# Patient Record
Sex: FEMALE | Race: BLACK OR AFRICAN AMERICAN | NOT HISPANIC OR LATINO | Employment: FULL TIME | URBAN - METROPOLITAN AREA
[De-identification: names, ages, dates, MRNs, and addresses within clinical notes are randomized per-mention and may not be internally consistent; named-entity substitution may affect disease eponyms.]

---

## 2017-01-23 ENCOUNTER — OFFICE VISIT (OUTPATIENT)
Dept: INTERNAL MEDICINE | Facility: CLINIC | Age: 57
End: 2017-01-23
Payer: COMMERCIAL

## 2017-01-23 VITALS
HEIGHT: 66 IN | SYSTOLIC BLOOD PRESSURE: 140 MMHG | OXYGEN SATURATION: 97 % | BODY MASS INDEX: 23.1 KG/M2 | WEIGHT: 143.75 LBS | HEART RATE: 69 BPM | DIASTOLIC BLOOD PRESSURE: 76 MMHG | TEMPERATURE: 98 F

## 2017-01-23 DIAGNOSIS — S76.912A MUSCLE STRAIN OF LEFT THIGH, INITIAL ENCOUNTER: Primary | ICD-10-CM

## 2017-01-23 PROCEDURE — 3077F SYST BP >= 140 MM HG: CPT | Mod: S$GLB,,, | Performed by: FAMILY MEDICINE

## 2017-01-23 PROCEDURE — 99999 PR PBB SHADOW E&M-EST. PATIENT-LVL III: CPT | Mod: PBBFAC,,, | Performed by: FAMILY MEDICINE

## 2017-01-23 PROCEDURE — 3078F DIAST BP <80 MM HG: CPT | Mod: S$GLB,,, | Performed by: FAMILY MEDICINE

## 2017-01-23 PROCEDURE — 99212 OFFICE O/P EST SF 10 MIN: CPT | Mod: S$GLB,,, | Performed by: FAMILY MEDICINE

## 2017-01-23 PROCEDURE — 1159F MED LIST DOCD IN RCRD: CPT | Mod: S$GLB,,, | Performed by: FAMILY MEDICINE

## 2017-01-23 RX ORDER — NAPROXEN 500 MG/1
500 TABLET ORAL 2 TIMES DAILY
Qty: 30 TABLET | Refills: 0 | Status: SHIPPED | OUTPATIENT
Start: 2017-01-23 | End: 2017-01-25

## 2017-01-23 NOTE — PROGRESS NOTES
Subjective:       Patient ID: Adelina Espitia is a 56 y.o. female.    Chief Complaint: pain in left side    HPI Comments: Pain to her left leg from groin to knee starting 4 days ago - while trying on a pair of pants. Notes that she was having left shoulder pain going on for a month - neck/shoulder blade/neck stiffness - and now that is not bothering her any more since she started with the leg pain - she is taking more medication now than previously.    Her right knee is doing very well - no problems since recovery from surgery last May.    Leg Pain    There was no injury mechanism. The pain is present in the left thigh. The quality of the pain is described as stabbing. The pain is at a severity of 10/10. The pain is severe. The pain has been constant since onset. Associated symptoms include an inability to bear weight. Pertinent negatives include no loss of sensation, muscle weakness or tingling. She has tried NSAIDs, heat and non-weight bearing (ibuprofen, jason body and back) for the symptoms. The treatment provided moderate relief.     Review of Systems   Cardiovascular: Negative for leg swelling.   Musculoskeletal: Positive for gait problem, myalgias, neck pain (resolved) and neck stiffness.   Neurological: Negative for tingling.       Objective:      Physical Exam   Constitutional: She is oriented to person, place, and time. She appears well-developed.   HENT:   Head: Normocephalic and atraumatic.   Cardiovascular: Normal rate, regular rhythm and normal heart sounds.    Pulmonary/Chest: Effort normal and breath sounds normal.   Musculoskeletal:   TTP to ant left thigh starting above the inguinal fold.   Pain with hip extension (posterior flexion) of right.  Midline spine and B paraspinals NTTP TS/LS. Buttocks NTTP B   Neurological: She is alert and oriented to person, place, and time.   M/S 5/5 knee/anikle B.   Skin: Skin is warm and dry.   Psychiatric: She has a normal mood and affect. Her behavior is  normal.       Assessment:       1. Muscle strain of left thigh, initial encounter        Plan:     1. Muscle strain of left thigh, initial encounter  naproxen (NAPROSYN) 500 MG tablet   recheck prn worsening and in 4 weeks if not better

## 2017-01-23 NOTE — PATIENT INSTRUCTIONS
Treating Strains and Sprains  Strains and sprains happen when muscles or other soft tissues near your bones stretch or tear. These injuries can cause bruising, swelling, and pain. To ease your discomfort and speed the healing of your strain or sprain, follow the tips below. Remember, a strain or sprain can take 6 to 8 weeks to heal.     Important Note: Do not give aspirin to children or teens without discussing it with your healthcare provider first.        Ice first, heat later  · Use ice for the first 24 to 48 hours after injury. Ice helps prevent swelling and reduce pain. Ice the injury for no more than 20 minutes at a time and allow at least  20 minutes between icing sessions.  · Apply heat after the first 72 hours, once the swelling has gone down. Heat relaxes muscles and increases blood flow. Soak the injured area in warm water or use a heating pad set on low for no more than 15 minutes at a time.  Wrap and elevate  · Wrap an injured limb firmly with an elastic bandage. This provides support and helps prevent swelling. Dont wear an elastic bandage overnight. Watch for tingling, numbness, or increased pain, and remove the bandage immediately if any of these occurs.  · Elevate the injured area to help reduce swelling and throbbing. Its best to raise an injured limb above the level of your heart.     Medicines  · Over-the-counter medicines such as acetaminophen or ibuprofen can help reduce pain. Some also help reduce swelling.  · Take medicine only as directed.  · Rest the area even if medicines are controlling the pain.  Rest  · Rest the injured area by not using it for 24 hours.  · When youre ready, return slowly to your normal activities. Rest the injured area often.  · Dont use or walk on an injured limb if it hurts.  © 6526-3146 [employer]. [address] [place] [address], [place] [address]. All rights reserved. This information is not intended as a substitute for professional medical care.  Always follow your healthcare professional's instructions.

## 2017-01-23 NOTE — MR AVS SNAPSHOT
[facility]  [address] [place] [address]  [place] [address]  Phone: [phone]  Fax: [phone]                  Adelina Espitia   2017 2:20 PM   Office Visit    Description:  Female : 1960   Provider:  Monica Mendez MD   Department:  [facility]           Reason for Visit     pain in left side           Diagnoses this Visit        Comments    Muscle strain of left thigh, initial encounter    -  Primary            To Do List           Goals (5 Years of Data)     None      Follow-Up and Disposition     Return in about 4 weeks (around 2017), or if symptoms worsen or fail to improve.    Follow-up and Disposition History       These Medications        Disp Refills Start End    naproxen (NAPROSYN) 500 MG tablet 30 tablet 0 2017     Take 1 tablet (500 mg total) by mouth 2 (two) times daily. As needed for pain/inflammation, with food - Oral    Pharmacy: [facility]/pharmacy #[id number] - [address] #: [phone]         [facility] On Call     [facility] On Call Nurse Care Line -  Assistance  Registered nurses in the [facility] On Call Center provide clinical advisement, health education, appointment booking, and other advisory services.  Call for this free service at [phone].             Medications           Message regarding Medications     Verify the changes and/or additions to your medication regime listed below are the same as discussed with your clinician today.  If any of these changes or additions are incorrect, please notify your healthcare provider.        START taking these NEW medications        Refills    naproxen (NAPROSYN) 500 MG tablet 0    Sig: Take 1 tablet (500 mg total) by mouth 2 (two) times daily. As needed for pain/inflammation, with food    Class: Normal    Route: Oral           Verify that the below list of medications is an accurate representation of the medications you are currently taking.  If none reported, the list  "may be blank. If incorrect, please contact your healthcare provider. Carry this list with you in case of emergency.           Current Medications     biotin 1 mg tablet Take 1,000 mcg by mouth once daily.    diltiazem (CARDIZEM CD) 180 MG 24 hr capsule Take 180 mg by mouth once daily.    losartan (COZAAR) 50 MG tablet Take 50 mg by mouth once daily.    LUMIGAN 0.01 % Drop 1 drop every evening. Both eyes    pantoprazole (PROTONIX) 40 MG tablet TAKE ONE TABLET BY MOUTH ONCE DAILY    naproxen (NAPROSYN) 500 MG tablet Take 1 tablet (500 mg total) by mouth 2 (two) times daily. As needed for pain/inflammation, with food           Clinical Reference Information           Vital Signs - Last Recorded  Most recent update: 1/23/2017  2:37 PM by Arlyn Soriano LPN    BP Pulse Temp Ht Wt LMP    (!) 140/76 (BP Location: Right arm, Patient Position: Sitting, BP Method: Automatic) 69 98 °F (36.7 °C) (Oral) 5' 5.5" (1.664 m) 65.2 kg (143 lb 11.8 oz) (LMP Unknown)    SpO2 BMI             97% 23.56 kg/m2         Blood Pressure          Most Recent Value    BP  (!)  140/76      Allergies as of 1/23/2017     No Known Allergies      Immunizations Administered on Date of Encounter - 1/23/2017     None      Instructions      Treating Strains and Sprains  Strains and sprains happen when muscles or other soft tissues near your bones stretch or tear. These injuries can cause bruising, swelling, and pain. To ease your discomfort and speed the healing of your strain or sprain, follow the tips below. Remember, a strain or sprain can take 6 to 8 weeks to heal.     Important Note: Do not give aspirin to children or teens without discussing it with your healthcare provider first.        Ice first, heat later  · Use ice for the first 24 to 48 hours after injury. Ice helps prevent swelling and reduce pain. Ice the injury for no more than 20 minutes at a time and allow at least  20 minutes between icing sessions.  · Apply heat after the first 72 " hours, once the swelling has gone down. Heat relaxes muscles and increases blood flow. Soak the injured area in warm water or use a heating pad set on low for no more than 15 minutes at a time.  Wrap and elevate  · Wrap an injured limb firmly with an elastic bandage. This provides support and helps prevent swelling. Dont wear an elastic bandage overnight. Watch for tingling, numbness, or increased pain, and remove the bandage immediately if any of these occurs.  · Elevate the injured area to help reduce swelling and throbbing. Its best to raise an injured limb above the level of your heart.     Medicines  · Over-the-counter medicines such as acetaminophen or ibuprofen can help reduce pain. Some also help reduce swelling.  · Take medicine only as directed.  · Rest the area even if medicines are controlling the pain.  Rest  · Rest the injured area by not using it for 24 hours.  · When youre ready, return slowly to your normal activities. Rest the injured area often.  · Dont use or walk on an injured limb if it hurts.  © 4427-8721 [employer] [facility]. [address] [place] [address], [place] [address]. All rights reserved. This information is not intended as a substitute for professional medical care. Always follow your healthcare professional's instructions.

## 2017-01-24 ENCOUNTER — TELEPHONE (OUTPATIENT)
Dept: INTERNAL MEDICINE | Facility: CLINIC | Age: 57
End: 2017-01-24

## 2017-01-24 DIAGNOSIS — S76.912A MUSCLE STRAIN OF LEFT THIGH, INITIAL ENCOUNTER: Primary | ICD-10-CM

## 2017-01-24 RX ORDER — METHOCARBAMOL 500 MG/1
TABLET, FILM COATED ORAL
Qty: 20 TABLET | Refills: 0 | Status: SHIPPED | OUTPATIENT
Start: 2017-01-24 | End: 2017-01-25

## 2017-01-24 NOTE — TELEPHONE ENCOUNTER
----- Message from Julia Mortensen sent at 1/24/2017  9:52 AM CST -----  Pt at [phone]//states she saw Dr Mendez yesterday//1/23/17//she was offered a muscle relaxer but refused//but now she feels she needs it//uses//[facility] in [place]//please call//thanks/lh

## 2017-01-24 NOTE — TELEPHONE ENCOUNTER
Pt Adelina Espitia was seen in our office on yesterday 1/23/17, she states that she was offered the muscle relaxer but refused it on yesterday but, is now feeling more pain and would like to know if you can call her in the muscle relaxer. She also states if her leg pain is still the same that she will be taking off from work on tomorrow also.            Please Advise.

## 2017-01-25 ENCOUNTER — OFFICE VISIT (OUTPATIENT)
Dept: INTERNAL MEDICINE | Facility: CLINIC | Age: 57
End: 2017-01-25
Payer: COMMERCIAL

## 2017-01-25 VITALS
SYSTOLIC BLOOD PRESSURE: 139 MMHG | DIASTOLIC BLOOD PRESSURE: 72 MMHG | TEMPERATURE: 99 F | WEIGHT: 142.19 LBS | BODY MASS INDEX: 23.3 KG/M2 | OXYGEN SATURATION: 98 % | HEART RATE: 78 BPM

## 2017-01-25 DIAGNOSIS — M54.50 ACUTE LEFT-SIDED LOW BACK PAIN WITHOUT SCIATICA: Primary | ICD-10-CM

## 2017-01-25 PROCEDURE — 3075F SYST BP GE 130 - 139MM HG: CPT | Mod: S$GLB,,, | Performed by: FAMILY MEDICINE

## 2017-01-25 PROCEDURE — 99213 OFFICE O/P EST LOW 20 MIN: CPT | Mod: S$GLB,,, | Performed by: FAMILY MEDICINE

## 2017-01-25 PROCEDURE — 99999 PR PBB SHADOW E&M-EST. PATIENT-LVL III: CPT | Mod: PBBFAC,,, | Performed by: FAMILY MEDICINE

## 2017-01-25 PROCEDURE — 1159F MED LIST DOCD IN RCRD: CPT | Mod: S$GLB,,, | Performed by: FAMILY MEDICINE

## 2017-01-25 PROCEDURE — 3078F DIAST BP <80 MM HG: CPT | Mod: S$GLB,,, | Performed by: FAMILY MEDICINE

## 2017-01-25 RX ORDER — ACETAMINOPHEN AND CODEINE PHOSPHATE 300; 60 MG/1; MG/1
1 TABLET ORAL 3 TIMES DAILY
Qty: 21 TABLET | Refills: 0 | Status: SHIPPED | OUTPATIENT
Start: 2017-01-25 | End: 2017-01-31

## 2017-01-25 RX ORDER — BACLOFEN 10 MG/1
10 TABLET ORAL 3 TIMES DAILY
Qty: 21 TABLET | Refills: 0 | Status: SHIPPED | OUTPATIENT
Start: 2017-01-25 | End: 2017-09-01 | Stop reason: ALTCHOICE

## 2017-01-25 RX ORDER — METHYLPREDNISOLONE 4 MG/1
TABLET ORAL
Qty: 1 PACKAGE | Refills: 0 | Status: SHIPPED | OUTPATIENT
Start: 2017-01-25 | End: 2017-02-15

## 2017-01-25 NOTE — PROGRESS NOTES
Subjective:       Patient ID: Adelina Espitia is a 56 y.o. female.    Chief Complaint: Back Pain and groin area and knee pain    HPI Comments: She continues with left-sided low back pain radiating to her left thigh but not below the knee.  She reports the onset when she was  trying on clothes and had a sudden  onset of back pain.  She has no past history of back pain.  She's had no unusual activity such as unusual lifting.  She does repeatedly lifting 10 pounds at work.  She reports her current medications have not helped.  She has no lower bladder or bowel incontinence    Back Pain   Pertinent negatives include no fever, numbness or weakness.     Review of Systems   Constitutional: Negative for fever.   Musculoskeletal: Positive for back pain.   Neurological: Negative for weakness and numbness.       Objective:      Physical Exam   Musculoskeletal:   Left sacral iliac tenderness.  Range of motion is full.  She has no sciatic area tenderness.  Deep tendon reflexes and straight leg raises are normal.       Assessment:       1. Acute left-sided low back pain without sciatica        Plan:   Medrol Dosepak, baclofen 10 mg 3 times a day, Tylenol with codeine 3 times a day.  Note given to stay off work until returns on Monday for recheck.  Heat  3 times a day.  Back rest.

## 2017-01-25 NOTE — LETTER
January 25, 2017      [address] [address]  Phone: [phone]  Fax: [phone]       Patient: Adelina Espitia   YOB: 1960  Date of Visit: 01/25/2017    To Whom It May Concern:    Adelina was at [facility] on 01/25/2017. She may return to work on Monday 01/30/2017. If you have any questions or concerns, please do not hesitate to contact the office.    Sincerely,        Dilia Ware LPN

## 2017-01-25 NOTE — LETTER
January 25, 2017      [address] [address]  Phone: [phone]  Fax: [phone]       Patient: Adelina Espitia   YOB: 1960  Date of Visit: 01/25/2017    To Whom It May Concern:    Adelina was at [facility] on 01/25/2017. She may return to work on Tuesday 01/31/2017 . If you have any questions or concerns,  please do not hesitate to contact the office.    Sincerely,      Dilia Ware LPN

## 2017-01-25 NOTE — MR AVS SNAPSHOT
[address]  [place] [address]  Phone: [phone]  Fax: [phone]                  Adelina Espitia   2017 2:00 PM   Office Visit    Description:  Female : 1960   Provider:  Guy Roberson MD   Department:  [facility]           Reason for Visit     Back Pain     groin area and knee pain           Diagnoses this Visit        Comments    Acute left-sided low back pain without sciatica    -  Primary            To Do List           Future Appointments        Provider Department Dept Phone    2017 2:00 PM Guy Roberson MD [facility] [phone]    2017 7:00 AM Guy Roberson MD [facility] [phone]    2017 9:20 AM Monica Mendez MD [facility] [phone]      Goals (5 Years of Data)     None      Follow-Up and Disposition     Return in about 5 days (around 2017), or dr mendez.    Follow-up and Disposition History       These Medications        Disp Refills Start End    methylPREDNISolone (MEDROL DOSEPACK) 4 mg tablet 1 Package 0 2017 2/15/2017    use as directed    Pharmacy: [facility]/pharmacy #532[facility] [place], [place] [address] [facility] #: [phone]       baclofen (LIORESAL) 10 MG tablet 21 tablet 0 2017     Take 1 tablet (10 mg total) by mouth 3 (three) times daily. - Oral    Pharmacy: [facility]/pharmacy #[facility] [place], [place] [address] [facility] #: [phone]       acetaminophen-codeine 300-60mg (TYLENOL #4) 300-60 mg Tab 21 tablet 0 2017    Take 1 tablet by mouth 3 (three) times daily. - Oral    Pharmacy: [facility]/pharmacy #[id number] - [place], [place] - [address] [facility] #: [phone]         [facility] On Call     [facility] On Call Nurse Care Line -  Assistance  Registered nurses in the [facility] On Call Center provide clinical advisement, health education, appointment booking, and other advisory services.  Call for this free service at [phone].             Medications            Message regarding Medications     Verify the changes and/or additions to your medication regime listed below are the same as discussed with your clinician today.  If any of these changes or additions are incorrect, please notify your healthcare provider.        START taking these NEW medications        Refills    methylPREDNISolone (MEDROL DOSEPACK) 4 mg tablet 0    Sig: use as directed    Class: Normal    baclofen (LIORESAL) 10 MG tablet 0    Sig: Take 1 tablet (10 mg total) by mouth 3 (three) times daily.    Class: Normal    Route: Oral    acetaminophen-codeine 300-60mg (TYLENOL #4) 300-60 mg Tab 0    Sig: Take 1 tablet by mouth 3 (three) times daily.    Class: Print    Route: Oral      STOP taking these medications     methocarbamol (ROBAXIN) 500 MG Tab 1 po up to TID prn. No driving. May take 1-2 qHS prn    naproxen (NAPROSYN) 500 MG tablet Take 1 tablet (500 mg total) by mouth 2 (two) times daily. As needed for pain/inflammation, with food           Verify that the below list of medications is an accurate representation of the medications you are currently taking.  If none reported, the list may be blank. If incorrect, please contact your healthcare provider. Carry this list with you in case of emergency.           Current Medications     biotin 1 mg tablet Take 1,000 mcg by mouth once daily.    diltiazem (CARDIZEM CD) 180 MG 24 hr capsule Take 180 mg by mouth once daily.    losartan (COZAAR) 50 MG tablet Take 50 mg by mouth once daily.    LUMIGAN 0.01 % Drop 1 drop every evening. Both eyes    pantoprazole (PROTONIX) 40 MG tablet TAKE ONE TABLET BY MOUTH ONCE DAILY    acetaminophen-codeine 300-60mg (TYLENOL #4) 300-60 mg Tab Take 1 tablet by mouth 3 (three) times daily.    baclofen (LIORESAL) 10 MG tablet Take 1 tablet (10 mg total) by mouth 3 (three) times daily.    methylPREDNISolone (MEDROL DOSEPACK) 4 mg tablet use as directed           Clinical Reference Information           Vital Signs - Last  Recorded  Most recent update: 1/25/2017  1:33 PM by Emilee Maya MA    BP Pulse Temp Wt    139/72 (BP Location: Right arm, Patient Position: Sitting, BP Method: Automatic) 78 99.1 °F (37.3 °C) (Tympanic) 64.5 kg (142 lb 3.2 oz)    LMP SpO2 BMI    (LMP Unknown) 98% 23.3 kg/m2      Blood Pressure          Most Recent Value    BP  139/72      Allergies as of 1/25/2017     No Known Allergies      Immunizations Administered on Date of Encounter - 1/25/2017     None

## 2017-01-30 ENCOUNTER — OFFICE VISIT (OUTPATIENT)
Dept: INTERNAL MEDICINE | Facility: CLINIC | Age: 57
End: 2017-01-30
Payer: COMMERCIAL

## 2017-01-30 VITALS
TEMPERATURE: 98 F | SYSTOLIC BLOOD PRESSURE: 147 MMHG | HEIGHT: 66 IN | BODY MASS INDEX: 22.99 KG/M2 | WEIGHT: 143.06 LBS | HEART RATE: 69 BPM | OXYGEN SATURATION: 98 % | DIASTOLIC BLOOD PRESSURE: 82 MMHG

## 2017-01-30 DIAGNOSIS — M54.50 ACUTE LEFT-SIDED LOW BACK PAIN WITHOUT SCIATICA: Primary | ICD-10-CM

## 2017-01-30 PROCEDURE — 3079F DIAST BP 80-89 MM HG: CPT | Mod: S$GLB,,, | Performed by: FAMILY MEDICINE

## 2017-01-30 PROCEDURE — 99999 PR PBB SHADOW E&M-EST. PATIENT-LVL III: CPT | Mod: PBBFAC,,, | Performed by: FAMILY MEDICINE

## 2017-01-30 PROCEDURE — 1159F MED LIST DOCD IN RCRD: CPT | Mod: S$GLB,,, | Performed by: FAMILY MEDICINE

## 2017-01-30 PROCEDURE — 99213 OFFICE O/P EST LOW 20 MIN: CPT | Mod: S$GLB,,, | Performed by: FAMILY MEDICINE

## 2017-01-30 PROCEDURE — 3077F SYST BP >= 140 MM HG: CPT | Mod: S$GLB,,, | Performed by: FAMILY MEDICINE

## 2017-01-30 NOTE — MR AVS SNAPSHOT
[facility]  [address] [facility]  [place] [address]  Phone: [phone]  Fax: [phone]                  Adelina Espitia   2017 7:00 AM   Office Visit    Description:  Female : 1960   Provider:  Guy Roberson MD   Department:  [facility]           Reason for Visit     Follow-up           Diagnoses this Visit        Comments    Acute left-sided low back pain without sciatica    -  Primary            To Do List           Future Appointments        Provider Department Dept Phone    2017 7:20 AM Misbah Childs MD [facility] [phone]    2017 7:30 AM Guy Roberson MD [facility] [phone]      Goals (5 Years of Data)     None      Follow-Up and Disposition     Return in about 1 week (around 2017).    Follow-up and Disposition History      [facility] On Call     [facility] On Call Nurse [facility] Line -  Assistance  Registered nurses in the [facility] On Call Center provide clinical advisement, health education, appointment booking, and other advisory services.  Call for this free service at [phone].             Medications           Message regarding Medications     Verify the changes and/or additions to your medication regime listed below are the same as discussed with your clinician today.  If any of these changes or additions are incorrect, please notify your healthcare provider.             Verify that the below list of medications is an accurate representation of the medications you are currently taking.  If none reported, the list may be blank. If incorrect, please contact your healthcare provider. Carry this list with you in case of emergency.           Current Medications     acetaminophen-codeine 300-60mg (TYLENOL #4) 300-60 mg Tab Take 1 tablet by mouth 3 (three) times daily.    baclofen (LIORESAL) 10 MG tablet Take 1 tablet (10 mg total) by mouth 3 (three) times daily.    biotin 1 mg tablet Take 1,000  "mcg by mouth once daily.    diltiazem (CARDIZEM CD) 180 MG 24 hr capsule Take 180 mg by mouth once daily.    losartan (COZAAR) 50 MG tablet Take 50 mg by mouth once daily.    LUMIGAN 0.01 % Drop 1 drop every evening. Both eyes    methylPREDNISolone (MEDROL DOSEPACK) 4 mg tablet use as directed    pantoprazole (PROTONIX) 40 MG tablet TAKE ONE TABLET BY MOUTH ONCE DAILY           Clinical Reference Information           Vital Signs - Last Recorded  Most recent update: 1/30/2017  7:08 AM by Dilia Ware LPN    BP Pulse Temp Ht Wt LMP    (!) 147/82 69 97.8 °F (36.6 °C) (Oral) 5' 5.5" (1.664 m) 64.9 kg (143 lb 1.3 oz) (LMP Unknown)    SpO2 BMI             98% 23.45 kg/m2         Blood Pressure          Most Recent Value    BP  (!)  147/82      Allergies as of 1/30/2017     No Known Allergies      Immunizations Administered on Date of Encounter - 1/30/2017     None      Orders Placed During Today's Visit      Normal Orders This Visit    Ambulatory referral to Pain Clinic       "

## 2017-01-30 NOTE — PROGRESS NOTES
Subjective:       Patient ID: Adelina Espitia is a 56 y.o. female.    Chief Complaint: Follow-up    HPI Comments: Her back pain is not better.  She continues the left lower back pain that radiates to the left knee.    Review of Systems   Constitutional: Negative for fever.   Gastrointestinal:        No bowel incontinence   Genitourinary:        No bladder incontinence   Musculoskeletal: Positive for back pain.   Neurological: Negative for weakness and numbness.       Objective:      Physical Exam   Musculoskeletal:   Left lumbar tenderness.  Full range of motion but with some discomfort.  Straight leg raises negative       Assessment:       1. Acute left-sided low back pain without sciatica        Plan:   She reports she is not able to return to work.  Discussed  treatment options.  She is not interested in physical therapy.  Refer Dr. Childs for his evaluation.  No work and follow-up in one week

## 2017-01-31 ENCOUNTER — HOSPITAL ENCOUNTER (OUTPATIENT)
Dept: RADIOLOGY | Facility: HOSPITAL | Age: 57
Discharge: HOME | End: 2017-01-31
Attending: ANESTHESIOLOGY
Payer: COMMERCIAL

## 2017-01-31 ENCOUNTER — OFFICE VISIT (OUTPATIENT)
Dept: PAIN MEDICINE | Facility: CLINIC | Age: 57
End: 2017-01-31
Payer: COMMERCIAL

## 2017-01-31 VITALS
RESPIRATION RATE: 16 BRPM | BODY MASS INDEX: 23.82 KG/M2 | HEIGHT: 65 IN | HEART RATE: 83 BPM | WEIGHT: 143 LBS | DIASTOLIC BLOOD PRESSURE: 70 MMHG | SYSTOLIC BLOOD PRESSURE: 134 MMHG

## 2017-01-31 DIAGNOSIS — M54.16 LEFT LUMBAR RADICULOPATHY: ICD-10-CM

## 2017-01-31 DIAGNOSIS — M54.16 LEFT LUMBAR RADICULOPATHY: Primary | ICD-10-CM

## 2017-01-31 PROCEDURE — 3075F SYST BP GE 130 - 139MM HG: CPT | Mod: S$GLB,,, | Performed by: ANESTHESIOLOGY

## 2017-01-31 PROCEDURE — 72114 X-RAY EXAM L-S SPINE BENDING: CPT | Mod: TC

## 2017-01-31 PROCEDURE — 1159F MED LIST DOCD IN RCRD: CPT | Mod: S$GLB,,, | Performed by: ANESTHESIOLOGY

## 2017-01-31 PROCEDURE — 99203 OFFICE O/P NEW LOW 30 MIN: CPT | Mod: S$GLB,,, | Performed by: ANESTHESIOLOGY

## 2017-01-31 PROCEDURE — 72114 X-RAY EXAM L-S SPINE BENDING: CPT | Mod: 26,,, | Performed by: RADIOLOGY

## 2017-01-31 PROCEDURE — 99999 PR PBB SHADOW E&M-EST. PATIENT-LVL IV: CPT | Mod: PBBFAC,,, | Performed by: ANESTHESIOLOGY

## 2017-01-31 PROCEDURE — 3078F DIAST BP <80 MM HG: CPT | Mod: S$GLB,,, | Performed by: ANESTHESIOLOGY

## 2017-01-31 RX ORDER — HYDROCODONE BITARTRATE AND ACETAMINOPHEN 5; 325 MG/1; MG/1
1 TABLET ORAL 3 TIMES DAILY PRN
Qty: 45 TABLET | Refills: 0 | Status: SHIPPED | OUTPATIENT
Start: 2017-01-31 | End: 2017-02-06

## 2017-01-31 NOTE — PROGRESS NOTES
"Chief Pain Complaint:  Low Back Pain, Left Leg Pain    History of Present Illness:   This patient is a 56 y.o. female who presents today complaining of the above noted pain/s. The patient describes the pain as follows.    - duration of pain: 2 weeks   - timing: constant   - character: aching, shooting  - radiating, dermatomal: extends into left leg along L3/4  - antecedent trauma, prior spinal surgery: patient reports prior trauma, no prior spinal surgery   - pertinent negatives: No fever, No chills, No weight loss, No bladder dysfunction, No bowel dysfunction, No saddle anesthesia  - pertinent positives: left leg weakness    - medications, other therapies tried (physical therapy, injections):     >> Tylenol, baclofen, medrol dose pack, tylenol #4    >> Has previously undergone Physical Therapy    >> Has NOT previously undergone spinal injection/s      Imaging / Labs / Studies (reviewed on 1/31/2017): none for review      Review of Systems:  CONSTITUTIONAL: patient denies any fever, chills, or weight loss  SKIN: patient denies any rash or itching  RESPIRATORY: patient denies having any shortness of breath  GASTROINTESTINAL: patient reports constipation  GENITOURINARY: patient denies having any abnormal bladder function    MUSCULOSKELETAL:  - patient complains of the above noted pain/s (see chief pain complaint)    NEUROLOGICAL:   - pain as above  - strength in Lower extremities is decreased, on the LEFT  - sensation in Lower extremities is intact, BILATERALLY  - patient denies any loss of bowel or bladder control      PSYCHIATRIC: patient denies any change in mood    Other:  All other systems reviewed and are negative      Physical Exam:  Visit Vitals    /70 (BP Location: Right arm, Patient Position: Sitting, BP Method: Automatic)    Pulse 83    Resp 16    Ht 5' 5" (1.651 m)    Wt 64.9 kg (143 lb)    LMP  (LMP Unknown)    BMI 23.8 kg/m2    (reviewed on 1/31/2017)  General: alert and oriented, in no " apparent distress  Gait: antalgic gait  Skin: No rashes, No discoloration, No obvious lesions  HEENT: EOMI  Cardiovascular: no significant peripheral edema present  Respiratory: respirations nonlabored    Musculoskeletal:  - Any pain on flexion, extension, rotation:    >> pain on extension and rotation  - Straight Leg Raise:     >> LEFT :: positive    >> RIGHT :: negative    - Any tenderness to palpation across paraspinal muscles, joints, bursae:     >> across lumbar paraspinals    Neuro:  - Extremity Strength:     >> LEFT :: dec on the left    >> RIGHT :: 5/5    Psych:  Mood and affect is appropriate      Assessment:  Lumbar Radiculopathy      Plan:  Patient has left leg pain x 2 weeks.  I will check lumbar xr, prescribe norco and have her let us know if pain does not taper in a couple of weeks.  If it does not, at that point will check mri.  I discuss treatment of constipation.  I discussed in detail the risks, benefits, and alternatives to any and all potential treatment options.  All questions and concerns were fully addressed today in clinic.      >>Pain Disability Index:  1/31/2017 :: 59    >>Opioid Risk Tool:  1/31/2017 :: 0

## 2017-02-02 ENCOUNTER — TELEPHONE (OUTPATIENT)
Dept: INTERNAL MEDICINE | Facility: CLINIC | Age: 57
End: 2017-02-02

## 2017-02-02 NOTE — TELEPHONE ENCOUNTER
----- Message from Anne Marie Early sent at 2/2/2017  4:27 PM CST -----  Contact: [employer]an [employer] short term disability  called  short term disability claim, faxed form....[phone] (claim nmbr [id number])

## 2017-02-03 ENCOUNTER — TELEPHONE (OUTPATIENT)
Dept: INTERNAL MEDICINE | Facility: CLINIC | Age: 57
End: 2017-02-03

## 2017-02-03 NOTE — TELEPHONE ENCOUNTER
Patient came by this morning requesting a work excuse for the last week.  Advise that we would have to check with the doctor and will call after the doctor's response.  She has an appointment on 2/07/16

## 2017-02-06 ENCOUNTER — OFFICE VISIT (OUTPATIENT)
Dept: INTERNAL MEDICINE | Facility: CLINIC | Age: 57
End: 2017-02-06
Payer: COMMERCIAL

## 2017-02-06 VITALS
TEMPERATURE: 99 F | DIASTOLIC BLOOD PRESSURE: 63 MMHG | WEIGHT: 141.63 LBS | OXYGEN SATURATION: 92 % | HEART RATE: 100 BPM | SYSTOLIC BLOOD PRESSURE: 111 MMHG | BODY MASS INDEX: 23.57 KG/M2

## 2017-02-06 DIAGNOSIS — M54.42 ACUTE MIDLINE LOW BACK PAIN WITH LEFT-SIDED SCIATICA: Primary | ICD-10-CM

## 2017-02-06 PROBLEM — M54.40 ACUTE MIDLINE LOW BACK PAIN WITH SCIATICA: Status: ACTIVE | Noted: 2017-02-06

## 2017-02-06 PROCEDURE — 3074F SYST BP LT 130 MM HG: CPT | Mod: S$GLB,,, | Performed by: FAMILY MEDICINE

## 2017-02-06 PROCEDURE — 3078F DIAST BP <80 MM HG: CPT | Mod: S$GLB,,, | Performed by: FAMILY MEDICINE

## 2017-02-06 PROCEDURE — 99213 OFFICE O/P EST LOW 20 MIN: CPT | Mod: S$GLB,,, | Performed by: FAMILY MEDICINE

## 2017-02-06 PROCEDURE — 99999 PR PBB SHADOW E&M-EST. PATIENT-LVL III: CPT | Mod: PBBFAC,,, | Performed by: FAMILY MEDICINE

## 2017-02-06 RX ORDER — BACLOFEN 10 MG/1
10 TABLET ORAL 3 TIMES DAILY
Qty: 90 TABLET | Refills: 0 | Status: SHIPPED | OUTPATIENT
Start: 2017-02-06 | End: 2017-03-06 | Stop reason: SDUPTHER

## 2017-02-06 RX ORDER — ACETAMINOPHEN AND CODEINE PHOSPHATE 300; 60 MG/1; MG/1
1 TABLET ORAL 3 TIMES DAILY
Qty: 90 TABLET | Refills: 0 | Status: SHIPPED | OUTPATIENT
Start: 2017-02-06 | End: 2017-02-16

## 2017-02-06 NOTE — MR AVS SNAPSHOT
[address]  [place] [address]  Phone: [phone]  Fax: [phone]                  Adelina Espitia   2017 10:00 AM   Office Visit    Description:  Female : 1960   Provider:  Guy Roberson MD   Department:  [facility]           Reason for Visit     FMLA paper work to be filled out           Diagnoses this Visit        Comments    Acute midline low back pain with left-sided sciatica    -  Primary            To Do List           Future Appointments        Provider Department Dept Phone    2017 7:30 AM Guy Roberson MD [facility] [phone]      Goals (5 Years of Data)     None      Follow-Up and Disposition     Return in about 1 month (around 3/6/2017).       These Medications        Disp Refills Start End    baclofen (LIORESAL) 10 MG tablet 90 tablet 0 2017    Take 1 tablet (10 mg total) by mouth 3 (three) times daily. - Oral    Pharmacy: [facility] #[id number] - [facility] [address] #: [phone]       acetaminophen-codeine 300-60mg (TYLENOL #4) 300-60 mg Tab 90 tablet 0 2017    Take 1 tablet by mouth 3 (three) times daily. - Oral    Pharmacy: [facility]/pharmacy #[id number] - [place], [place] - [address] #: [phone]         [facility]     [facility] On Call Nurse Care Line -  Assistance  Registered nurses in the [facility] On Call Center provide clinical advisement, health education, appointment booking, and other advisory services.  Call for this free service at [phone].             Medications           Message regarding Medications     Verify the changes and/or additions to your medication regime listed below are the same as discussed with your clinician today.  If any of these changes or additions are incorrect, please notify your healthcare provider.        START taking these NEW medications        Refills    baclofen (LIORESAL) 10 MG tablet 0    Sig: Take 1 tablet (10 mg  total) by mouth 3 (three) times daily.    Class: Normal    Route: Oral    acetaminophen-codeine 300-60mg (TYLENOL #4) 300-60 mg Tab 0    Sig: Take 1 tablet by mouth 3 (three) times daily.    Class: Print    Route: Oral      STOP taking these medications     hydrocodone-acetaminophen 5-325mg (NORCO) 5-325 mg per tablet Take 1 tablet by mouth 3 (three) times daily as needed for Pain.           Verify that the below list of medications is an accurate representation of the medications you are currently taking.  If none reported, the list may be blank. If incorrect, please contact your healthcare provider. Carry this list with you in case of emergency.           Current Medications     biotin 1 mg tablet Take 1,000 mcg by mouth once daily.    diltiazem (CARDIZEM CD) 180 MG 24 hr capsule Take 180 mg by mouth once daily.    losartan (COZAAR) 50 MG tablet Take 50 mg by mouth once daily.    LUMIGAN 0.01 % Drop 1 drop every evening. Both eyes    pantoprazole (PROTONIX) 40 MG tablet TAKE ONE TABLET BY MOUTH ONCE DAILY    acetaminophen-codeine 300-60mg (TYLENOL #4) 300-60 mg Tab Take 1 tablet by mouth 3 (three) times daily.    baclofen (LIORESAL) 10 MG tablet Take 1 tablet (10 mg total) by mouth 3 (three) times daily.    baclofen (LIORESAL) 10 MG tablet Take 1 tablet (10 mg total) by mouth 3 (three) times daily.    methylPREDNISolone (MEDROL DOSEPACK) 4 mg tablet use as directed           Clinical Reference Information           Your Vitals Were     BP Pulse Temp Weight    111/63 (BP Location: Right arm, Patient Position: Sitting, BP Method: Automatic) 100 98.6 °F (37 °C) (Tympanic) 64.3 kg (141 lb 10.3 oz)    Last Period SpO2 BMI    (LMP Unknown) 92% 23.57 kg/m2      Blood Pressure          Most Recent Value    BP  111/63      Allergies as of 2/6/2017     No Known Allergies      Immunizations Administered on Date of Encounter - 2/6/2017     None      Language Assistance Services     ATTENTION: Language assistance services are  available, free of charge. Please call [phone].      ATENCIÓN: Si habla español, tiene a matthews disposición servicios gratuitos de asistencia lingüística. Llame al [phone].     CHÚ Ý: N?u b?n nói Ti?ng Vi?t, có các d?ch v? h? tr? ngôn ng? mi?n phí dành cho b?n. G?i s? [phone].         [facility] complies with applicable Federal civil rights laws and does not discriminate on the basis of race, color, national origin, age, disability, or sex.

## 2017-02-06 NOTE — LETTER
02/06/2017    To Whom It May Concern                [address]  [place] [address]  Phone: [phone]  Fax: [phone]   02/06/2017    Patient: Adelina Espitia   YOB: 1960   Date of Visit: 2/6/2017       To Whom it May Concern:    Adelina Espitia was seen in the clinic on Monday 1/30/17. Please excuse her from work on the following days 1/30/17 though 2/3/17. She has also nazario seen in the office  on today 2/6/17. She will be able to return back to work on 3/20/17    If you have any questions or concerns, please don't hesitate to call.    Sincerely,         Kristi

## 2017-02-06 NOTE — PROGRESS NOTES
Subjective:       Patient ID: Adelina Espitia is a 56 y.o. female.    Chief Complaint: FMLA paper work to be filled out    HPI Comments: Follow-up low-back pain with left leg sciatica.  She needs FMLA papers completed today.  She's been evaluated by   pain management.  His notes were reviewed.  Her work is  a [employer].    Review of Systems   Musculoskeletal: Positive for back pain.       Objective:      Physical Exam   Musculoskeletal:   Lumbar tenderness.  She appears uncomfortable sitting.       Assessment:       1. Acute midline low back pain with left-sided sciatica        Plan:       She stopped hydrocodone due to side effects.  Baclofen and Tylenol number 4 refilled  today.  FMLA papers were completed.  She is to follow up with pain management 2 weeks.  Follow-up with me in one month.

## 2017-02-20 ENCOUNTER — PATIENT OUTREACH (OUTPATIENT)
Dept: ADMINISTRATIVE | Facility: HOSPITAL | Age: 57
End: 2017-02-20

## 2017-02-20 NOTE — LETTER
February 20, 2017    Adelina [place] Omkar  [address] [place] [address]             [facility]  [address]  [facility] [address]  Phone: [phone] Dear Mrs. Espitia:    [facility] is committed to your overall health.  To help you get the most out of each of your visits, we will review your information to make sure you are up to date on all of your recommended tests and/or procedures.      Monica Mendez MD has found that you may be due for   Health Maintenance Due   Topic    Hepatitis C Screening     TETANUS VACCINE     Influenza Vaccine         If you have had any of the above done at another facility, please bring the records or information with you so that your record at [facility] will be complete.    If you are currently taking medication, please bring it with you to your appointment for review.    We will be happy to assist you with scheduling any necessary appointments or you may contact the [facility] appointment desk at [phone] to schedule at your convenience.     Thank you for choosing [facility] for your healthcare needs,            If you have any questions or concerns, please don't hesitate to call.    Sincerely,  Carmenza DICKENS LPN Care Coordinator  [facility]

## 2017-02-20 NOTE — PROGRESS NOTES
[facility] is committed to your overall health.  To help you get the most out of each of your visits, we will review your information to make sure you are up to date on all of your recommended tests and/or procedures.      Monica Mendez MD has found that you may be due for   Health Maintenance Due   Topic    Hepatitis C Screening     TETANUS VACCINE     Influenza Vaccine         If you have had any of the above done at another facility, please bring the records or information with you so that your record at [facility] will be complete.    If you are currently taking medication, please bring it with you to your appointment for review.    We will be happy to assist you with scheduling any necessary appointments or you may contact the [facility] appointment desk at [phone] to schedule at your convenience.     Thank you for choosing [facility] for your healthcare needs,

## 2017-02-24 NOTE — MR AVS SNAPSHOT
[facility]  [address]  [place] [address]  Phone: [phone]  Fax: [phone]                  Adelina Espitia   2017 7:20 AM   Office Visit    Description:  Female : 1960   Provider:  Misbah Childs MD   Department:  [facility]           Reason for Visit     Low-back Pain                To Do List           Future Appointments        Provider Department Dept Phone    2017 7:30 AM Guy Roberson MD [facility] [phone]      Goals (5 Years of Data)     None      [facility]     [facility] Nurse Care Line -  Assistance  Registered nurses in the [facility] On Call Center provide clinical advisement, health education, appointment booking, and other advisory services.  Call for this free service at [phone].             Medications           Message regarding Medications     Verify the changes and/or additions to your medication regime listed below are the same as discussed with your clinician today.  If any of these changes or additions are incorrect, please notify your healthcare provider.             Verify that the below list of medications is an accurate representation of the medications you are currently taking.  If none reported, the list may be blank. If incorrect, please contact your healthcare provider. Carry this list with you in case of emergency.           Current Medications     acetaminophen-codeine 300-60mg (TYLENOL #4) 300-60 mg Tab Take 1 tablet by mouth 3 (three) times daily.    baclofen (LIORESAL) 10 MG tablet Take 1 tablet (10 mg total) by mouth 3 (three) times daily.    biotin 1 mg tablet Take 1,000 mcg by mouth once daily.    diltiazem (CARDIZEM CD) 180 MG 24 hr capsule Take 180 mg by mouth once daily.    losartan (COZAAR) 50 MG tablet Take 50 mg by mouth once daily.    LUMIGAN 0.01 % Drop 1 drop every evening. Both eyes    methylPREDNISolone (MEDROL DOSEPACK) 4 mg tablet  Problem: Goal Outcome Summary  Goal: Goal Outcome Summary  SLP: Vika odonnell attempted this AM. Pt busy with nursing cares. SLP will re-attempt as schedule allows.        "use as directed    pantoprazole (PROTONIX) 40 MG tablet TAKE ONE TABLET BY MOUTH ONCE DAILY           Clinical Reference Information           Vital Signs - Last Recorded  Most recent update: 1/31/2017  7:27 AM by Wanda Cardoza MA    BP Pulse Resp Ht Wt LMP    134/70 (BP Location: Right arm, Patient Position: Sitting, BP Method: Automatic) 83 16 5' 5" (1.651 m) 64.9 kg (143 lb) (LMP Unknown)    BMI                23.8 kg/m2          Blood Pressure          Most Recent Value    BP  134/70      Allergies as of 1/31/2017     No Known Allergies      Immunizations Administered on Date of Encounter - 1/31/2017     None      "

## 2017-03-06 ENCOUNTER — OFFICE VISIT (OUTPATIENT)
Dept: INTERNAL MEDICINE | Facility: CLINIC | Age: 57
End: 2017-03-06
Payer: COMMERCIAL

## 2017-03-06 VITALS
WEIGHT: 141.19 LBS | HEART RATE: 81 BPM | OXYGEN SATURATION: 98 % | DIASTOLIC BLOOD PRESSURE: 78 MMHG | SYSTOLIC BLOOD PRESSURE: 127 MMHG | HEIGHT: 66 IN | TEMPERATURE: 98 F | BODY MASS INDEX: 22.69 KG/M2

## 2017-03-06 DIAGNOSIS — M54.32 LEFT SIDED SCIATICA: Primary | ICD-10-CM

## 2017-03-06 DIAGNOSIS — M25.562 ACUTE PAIN OF LEFT KNEE: ICD-10-CM

## 2017-03-06 PROCEDURE — 3074F SYST BP LT 130 MM HG: CPT | Mod: S$GLB,,, | Performed by: FAMILY MEDICINE

## 2017-03-06 PROCEDURE — 99999 PR PBB SHADOW E&M-EST. PATIENT-LVL III: CPT | Mod: PBBFAC,,, | Performed by: FAMILY MEDICINE

## 2017-03-06 PROCEDURE — 99213 OFFICE O/P EST LOW 20 MIN: CPT | Mod: S$GLB,,, | Performed by: FAMILY MEDICINE

## 2017-03-06 PROCEDURE — 1160F RVW MEDS BY RX/DR IN RCRD: CPT | Mod: S$GLB,,, | Performed by: FAMILY MEDICINE

## 2017-03-06 PROCEDURE — 3078F DIAST BP <80 MM HG: CPT | Mod: S$GLB,,, | Performed by: FAMILY MEDICINE

## 2017-03-06 RX ORDER — NAPROXEN 500 MG/1
500 TABLET ORAL 2 TIMES DAILY
COMMUNITY
End: 2017-03-06

## 2017-03-06 NOTE — MR AVS SNAPSHOT
[facility] [address]  [place] [address]  Phone: [phone]  Fax: [phone]                  Adelina Espitia   3/6/2017 8:20 AM   Office Visit    Description:  Female : 1960   Provider:  Monica Mendez MD   Department:  [facility]           Reason for Visit     Back Pain           Diagnoses this Visit        Comments    Left sided sciatica    -  Primary     Acute pain of left knee                To Do List           Future Appointments        Provider Department Dept Phone    3/14/2017 9:00 AM Misbah Childs MD [facility] [phone]    3/20/2017 9:40 AM Monica Mendez MD [facility] [phone]      Goals (5 Years of Data)     None      Follow-Up and Disposition     Return in about 2 weeks (around 3/20/2017).    Follow-up and Disposition History      [facility]     [facility] Call [facility] -  Assistance  Registered nurses in the [facility] On Call Center provide clinical advisement, health education, appointment booking, and other advisory services.  Call for this free service at [phone].             Medications           Message regarding Medications     Verify the changes and/or additions to your medication regime listed below are the same as discussed with your clinician today.  If any of these changes or additions are incorrect, please notify your healthcare provider.        STOP taking these medications     naproxen (NAPROSYN) 500 MG tablet Take 500 mg by mouth 2 (two) times daily.           Verify that the below list of medications is an accurate representation of the medications you are currently taking.  If none reported, the list may be blank. If incorrect, please contact your healthcare provider. Carry this list with you in case of emergency.           Current Medications     ACETAMINOPHEN WITH CODEINE (ACETAMINOPHEN-CODEINE ORAL) Take 1 tablet by mouth 3 (three) times daily. #4     "baclofen (LIORESAL) 10 MG tablet Take 1 tablet (10 mg total) by mouth 3 (three) times daily.    biotin 1 mg tablet Take 1,000 mcg by mouth once daily.    diltiazem (CARDIZEM CD) 180 MG 24 hr capsule Take 180 mg by mouth once daily.    losartan (COZAAR) 50 MG tablet Take 50 mg by mouth once daily.    LUMIGAN 0.01 % Drop 1 drop every evening. Both eyes    pantoprazole (PROTONIX) 40 MG tablet TAKE ONE TABLET BY MOUTH ONCE DAILY           Clinical Reference Information           Your Vitals Were     BP Pulse Temp Height Weight Last Period    127/78 (BP Location: Right arm, Patient Position: Sitting, BP Method: Automatic) 81 97.9 °F (36.6 °C) (Oral) 5' 6" (1.676 m) 64 kg (141 lb 3.3 oz) (LMP Unknown)    SpO2 BMI             98% 22.79 kg/m2         Blood Pressure          Most Recent Value    BP  127/78      Allergies as of 3/6/2017     No Known Allergies      Immunizations Administered on Date of Encounter - 3/6/2017     None      Orders Placed During Today's Visit      Normal Orders This Visit    Ambulatory Referral to Physical/Occupational Therapy       Language Assistance Services     ATTENTION: Language assistance services are available, free of charge. Please call [phone].      ATENCIÓN: Si habla frankie, tiene a matthews disposición servicios gratuitos de asistencia lingüística. Llame al [phone].     [facility] Ý: N?u b?n nói Ti?ng Vi?t, có các d?ch v? h? tr? ngôn ng? mi?n phí dành cho b?n. G?i s? [phone].         [facility] complies with applicable Federal civil rights laws and does not discriminate on the basis of race, color, national origin, age, disability, or sex.        "

## 2017-03-06 NOTE — PROGRESS NOTES
"Subjective:       Patient ID: Adelina Espitia is a 56 y.o. female.    Chief Complaint: Back Pain    HPI Comments: She was doing fairly well with her LLE pain - was taking the tylenol #4 just once daily in AM last week - had been weaning herself off it. Thinks she may have taken none Thursday and Friday AM. Then Friday stepped up into her truck after an eye appt and had severe pain into her knee - "felt it would give out" . This was the first time driving since January. When she got home she contd to feel knee pain and up into her left buttock, no midline low back pain, just to left.  She took a pain pill that night, and one Sat, and none yesterday - thinks it has not changed - increased pain with bending. Severity 10/10. 8/10 at rest. Did not use the muscle relaxer this weekend.    Back Pain   The symptoms are aggravated by bending, sitting and lying down. Associated symptoms include leg pain, numbness and tingling. Pertinent negatives include no abdominal pain, bladder incontinence, bowel incontinence or fever. She has tried muscle relaxant and analgesics for the symptoms. The treatment provided mild relief.     Review of Systems   Constitutional: Negative for fever.   Gastrointestinal: Negative for abdominal pain and bowel incontinence.   Genitourinary: Negative for bladder incontinence.   Musculoskeletal: Positive for arthralgias and back pain.   Neurological: Positive for tingling and numbness.       Objective:      Physical Exam   Constitutional: She is oriented to person, place, and time. She appears well-developed.   HENT:   Head: Normocephalic and atraumatic.   Cardiovascular: Normal rate, regular rhythm and normal heart sounds.    Pulmonary/Chest: Effort normal and breath sounds normal.   Musculoskeletal:   Midline spine TS, LS NTTP. Paraspinals NTTP B TS,LS.  Left buttock TTP, R NTTP.    Left medial knee joint TTP, R NTTP. L medial iknee TTP sup/inf to knee joint. No swelling/warmth/erythema to " either knee.   Neurological: She is alert and oriented to person, place, and time.   Negative SLR B.  MS knee/ankle B 5/5 F/E.  Light touch decreased sensitivity to lat left thigh to midline front and back, extending to lateral left mid calf.   Skin: Skin is warm and dry.   Psychiatric: She has a normal mood and affect. Her behavior is normal.       Assessment:       1. Left sided sciatica    2. Acute pain of left knee        Plan:     1. Left sided sciatica  Ambulatory Referral to Physical/Occupational Therapy   2. Acute pain of left knee  Ambulatory Referral to Physical/Occupational Therapy     She has pain med and muscle relaxer for use prn.  She is given option to F/U with Dr Childs in 2 weeks - she prefers to F/U here, but I will make appt to see him for 3/17/17.

## 2017-03-08 ENCOUNTER — PATIENT OUTREACH (OUTPATIENT)
Dept: ADMINISTRATIVE | Facility: HOSPITAL | Age: 57
End: 2017-03-08

## 2017-03-14 ENCOUNTER — OFFICE VISIT (OUTPATIENT)
Dept: PAIN MEDICINE | Facility: CLINIC | Age: 57
End: 2017-03-14
Payer: COMMERCIAL

## 2017-03-14 VITALS
HEART RATE: 103 BPM | HEIGHT: 66 IN | SYSTOLIC BLOOD PRESSURE: 134 MMHG | DIASTOLIC BLOOD PRESSURE: 69 MMHG | BODY MASS INDEX: 22.66 KG/M2 | WEIGHT: 141 LBS

## 2017-03-14 DIAGNOSIS — M54.16 LEFT LUMBAR RADICULOPATHY: Primary | ICD-10-CM

## 2017-03-14 DIAGNOSIS — M47.817 LUMBOSACRAL SPONDYLOSIS WITHOUT MYELOPATHY: Primary | ICD-10-CM

## 2017-03-14 PROCEDURE — 3078F DIAST BP <80 MM HG: CPT | Mod: S$GLB,,, | Performed by: ANESTHESIOLOGY

## 2017-03-14 PROCEDURE — 99999 PR PBB SHADOW E&M-EST. PATIENT-LVL III: CPT | Mod: PBBFAC,,, | Performed by: ANESTHESIOLOGY

## 2017-03-14 PROCEDURE — 99213 OFFICE O/P EST LOW 20 MIN: CPT | Mod: S$GLB,,, | Performed by: ANESTHESIOLOGY

## 2017-03-14 PROCEDURE — 1160F RVW MEDS BY RX/DR IN RCRD: CPT | Mod: S$GLB,,, | Performed by: ANESTHESIOLOGY

## 2017-03-14 PROCEDURE — 3075F SYST BP GE 130 - 139MM HG: CPT | Mod: S$GLB,,, | Performed by: ANESTHESIOLOGY

## 2017-03-14 NOTE — PROGRESS NOTES
Chief Pain Complaint:  Low Back Pain, Left Leg Pain    History of Present Illness:   This patient is a 56 y.o. female who presents today complaining of the above noted pain/s. The patient describes the pain as follows.    - duration of pain: 6 weeks   - timing: constant in left knee  - character: aching tingling, numbness  - radiating, dermatomal: extends into left leg along L3/4  - antecedent trauma, prior spinal surgery: patient reports prior trauma, no prior spinal surgery   - pertinent negatives: No fever, No chills, No weight loss, No bladder dysfunction, No bowel dysfunction, No saddle anesthesia  - pertinent positives: left leg weakness    - medications, other therapies tried (physical therapy, injections):     >> Tylenol, baclofen, medrol dose pack, tylenol #4    >> Has previously undergone Physical Therapy    >> Has NOT previously undergone spinal injection/s      Imaging / Labs / Studies (reviewed on 3/14/2017):       1-31-17 xr lumbar:  Findings: There is anatomic spinal alignment.  No translational instability on flexion or extension.  Mild degenerative disc space narrowing and facet arthropathy at L4-L5 and L5-S1.  Pedicles are intact.  No vertebral compression fracture.        Review of Systems:  CONSTITUTIONAL: patient denies any fever, chills, or weight loss  SKIN: patient denies any rash or itching  RESPIRATORY: patient denies having any shortness of breath  GASTROINTESTINAL: patient denied constipation or diarrhea  GENITOURINARY: patient denies having any abnormal bladder function    MUSCULOSKELETAL:  - patient complains of the above noted pain/s (see chief pain complaint)    NEUROLOGICAL:   - pain as above  - strength in Lower extremities is decreased, on the LEFT  - sensation in Lower extremities is intact, BILATERALLY  - patient denies any loss of bowel or bladder control      PSYCHIATRIC: patient denies any change in mood    Other:  All other systems reviewed and are negative      Physical  "Exam:  /69  Pulse 103  Ht 5' 6" (1.676 m)  Wt 64 kg (141 lb)  LMP  (LMP Unknown)  BMI 22.76 kg/m2 (reviewed on 3/14/2017)  General: alert and oriented, in no apparent distress  Gait: antalgic gait  Skin: No rashes, No discoloration, No obvious lesions  HEENT: EOMI  Cardiovascular: no significant peripheral edema present  Respiratory: respirations nonlabored    Musculoskeletal:  - Any pain on flexion, extension, rotation:    >> pain on extension and rotation  - Straight Leg Raise:     >> LEFT :: positive    >> RIGHT :: negative    - Any tenderness to palpation across paraspinal muscles, joints, bursae:     >> across lumbar paraspinals    Neuro:  - Extremity Strength:     >> LEFT :: dec on the left    >> RIGHT :: 5/5    Psych:  Mood and affect is appropriate      Assessment:  Lumbar Radiculopathy      Plan:  Patient has left leg pain x 6 weeks.  She reports that pain has improved, weakness has lessened as well.  I review lumbar xr, I will order a lumbar MRI today as patient still has some left sided weakness.  She has medications and does not need refills today.  Return to clinic in approximately 2 weeks for reevaluation.  I discussed in detail the risks, benefits, and alternatives to any and all potential treatment options.  All questions and concerns were fully addressed today in clinic.    >>PDI:  3/14/2017 :: 28    >>Opioid Risk Tool:  1/31/2017 :: 0    "

## 2017-03-14 NOTE — MR AVS SNAPSHOT
[facility]  [address]  [place] [address]  Phone: [phone]  Fax: [phone]                  Adelina Espitia   3/14/2017 9:00 AM   Office Visit    Description:  Female : 1960   Provider:  Misbah Childs MD   Department:  [facility]           Reason for Visit     Low-back Pain     Leg Pain           Diagnoses this Visit        Comments    Left lumbar radiculopathy    -  Primary            To Do List           Future Appointments        Provider Department Dept Phone    3/17/2017 4:00 PM [facility] MRI1 [facility] -  [phone]    3/20/2017 9:40 AM Monica Mendez MD [facility] [phone]    3/28/2017 10:00 AM Misbah Childs MD [facility] [phone]      Goals (5 Years of Data)     None      Follow-Up and Disposition     Return in about 2 weeks (around 3/28/2017) for f/u after Imaging.      [facility]     [facility] -  Assistance  Registered nurses in the [facility] provide clinical advisement, health education, appointment booking, and other advisory services.  Call for this free service at [phone].             Medications           Message regarding Medications     Verify the changes and/or additions to your medication regime listed below are the same as discussed with your clinician today.  If any of these changes or additions are incorrect, please notify your healthcare provider.             Verify that the below list of medications is an accurate representation of the medications you are currently taking.  If none reported, the list may be blank. If incorrect, please contact your healthcare provider. Carry this list with you in case of emergency.           Current Medications     ACETAMINOPHEN WITH CODEINE (ACETAMINOPHEN-CODEINE ORAL) Take 1 tablet by mouth 3 (three) times daily. #4    baclofen (LIORESAL) 10 MG tablet Take 1  "tablet (10 mg total) by mouth 3 (three) times daily.    biotin 1 mg tablet Take 1,000 mcg by mouth once daily.    diltiazem (CARDIZEM CD) 180 MG 24 hr capsule Take 180 mg by mouth once daily.    losartan (COZAAR) 50 MG tablet Take 50 mg by mouth once daily.    LUMIGAN 0.01 % Drop 1 drop every evening. Both eyes    pantoprazole (PROTONIX) 40 MG tablet TAKE ONE TABLET BY MOUTH ONCE DAILY           Clinical Reference Information           Your Vitals Were     BP Pulse Height Weight Last Period BMI    134/69 103 5' 6" (1.676 m) 64 kg (141 lb) (LMP Unknown) 22.76 kg/m2      Blood Pressure          Most Recent Value    BP  134/69      Allergies as of 3/14/2017     No Known Allergies      Immunizations Administered on Date of Encounter - 3/14/2017     None      Language Assistance Services     ATTENTION: Language assistance services are available, free of charge. Please call [phone].      ATENCIÓN: Si habla frankie, tiene a matthews disposición servicios gratuitos de asistencia lingüística. Llame al [phone].     CHÚ Ý: N?u b?n nói Ti?ng Vi?t, có các d?ch v? h? tr? ngôn ng? mi?n phí dành cho b?n. G?i s? [phone].         [facility] complies with applicable Federal civil rights laws and does not discriminate on the basis of race, color, national origin, age, disability, or sex.        "

## 2017-03-17 ENCOUNTER — HOSPITAL ENCOUNTER (OUTPATIENT)
Dept: RADIOLOGY | Facility: HOSPITAL | Age: 57
Discharge: HOME | End: 2017-03-17
Attending: ANESTHESIOLOGY
Payer: COMMERCIAL

## 2017-03-17 DIAGNOSIS — M47.817 LUMBOSACRAL SPONDYLOSIS WITHOUT MYELOPATHY: ICD-10-CM

## 2017-03-17 PROCEDURE — 72148 MRI LUMBAR SPINE W/O DYE: CPT | Mod: TC

## 2017-03-20 ENCOUNTER — OFFICE VISIT (OUTPATIENT)
Dept: INTERNAL MEDICINE | Facility: CLINIC | Age: 57
End: 2017-03-20
Payer: COMMERCIAL

## 2017-03-20 VITALS
WEIGHT: 143.5 LBS | HEART RATE: 74 BPM | TEMPERATURE: 97 F | DIASTOLIC BLOOD PRESSURE: 72 MMHG | BODY MASS INDEX: 23.16 KG/M2 | OXYGEN SATURATION: 98 % | SYSTOLIC BLOOD PRESSURE: 120 MMHG

## 2017-03-20 DIAGNOSIS — M54.32 LEFT SIDED SCIATICA: Primary | ICD-10-CM

## 2017-03-20 PROCEDURE — 99999 PR PBB SHADOW E&M-EST. PATIENT-LVL III: CPT | Mod: PBBFAC,,, | Performed by: FAMILY MEDICINE

## 2017-03-20 PROCEDURE — 1160F RVW MEDS BY RX/DR IN RCRD: CPT | Mod: S$GLB,,, | Performed by: FAMILY MEDICINE

## 2017-03-20 PROCEDURE — 3078F DIAST BP <80 MM HG: CPT | Mod: S$GLB,,, | Performed by: FAMILY MEDICINE

## 2017-03-20 PROCEDURE — 3074F SYST BP LT 130 MM HG: CPT | Mod: S$GLB,,, | Performed by: FAMILY MEDICINE

## 2017-03-20 PROCEDURE — 99212 OFFICE O/P EST SF 10 MIN: CPT | Mod: S$GLB,,, | Performed by: FAMILY MEDICINE

## 2017-03-20 NOTE — PROGRESS NOTES
Subjective:       Patient ID: Adelina Espitia is a 56 y.o. female.    Chief Complaint: back pain follow up    HPI Comments: She saw Dr Childs and had MRI last Friday - she sees him again 3/28/17 for the results. She continues with pain toleft buttock running down ant-lat thigh - now going no further than the knee.  states her pain was no more than 2/10 over the weekend - she is doing PT at [facility] and doing HEP which is helping a lot. Not back at work yet. Still has pain with a long distance. Her work is on her feet in a kitchen.  She is using baclofen TID - no OTCs and not using the Tylenol with codeine. Sleep is not interrupted unless she is on her left side a long time - turns over and it is relieved. Long sitting is aggravating.    She thinks she will be ready/able to RTW 4/1/17.    Review of Systems   Constitutional: Negative for fever.   Musculoskeletal: Positive for arthralgias, back pain and myalgias (cramping behind right knee with getting in and out of truck).       Objective:      Physical Exam   Constitutional: She is oriented to person, place, and time. She appears well-developed.   HENT:   Head: Normocephalic and atraumatic.   Cardiovascular: Normal rate and regular rhythm.    Murmur (2/6 Roque RSB) heard.  Pulmonary/Chest: Effort normal and breath sounds normal.   Musculoskeletal:   Painless rotation B hips int/ext seated.  Midline and paraspinals TS/LS NTTP. No significant TTP left buttock. NTTP right.   Neurological: She is alert and oriented to person, place, and time.   -5/5 left knee extension, 5/5 right.  5/5 b knee flexion. Ankle F/E appears =B   Skin: Skin is warm and dry.   Psychiatric: She has a normal mood and affect. Her behavior is normal.       Assessment:       1. Left sided sciatica        Plan:     1. Left sided sciatica       She will follow-up with Dr. Childs next week.  She has been doing very well with home exercise program.  She feels she has been improving and would  like to return to work approximately 4/1/17.  I would like Dr. Childs's recommendations on restrictions if any.  I'll be happy to fill out her FMLA forms if needed.

## 2017-03-20 NOTE — MR AVS SNAPSHOT
[facility]  [address]  [place] [address]  Phone: [phone]  Fax: [phone]                  Adelina Espitia   3/20/2017 9:40 AM   Office Visit    Description:  Female : 1960   Provider:  Monica Mendez MD   Department:  [facility]           Reason for Visit     back pain follow up           Diagnoses this Visit        Comments    Left sided sciatica    -  Primary            To Do List           Future Appointments        Provider Department Dept Phone    3/28/2017 10:00 AM Misbah Childs MD [facility] [phone]    3/31/2017 8:00 AM Monica Mendez MD [facility] [phone]      Goals (5 Years of Data)     None      Follow-Up and Disposition     Return in about 11 days (around 3/31/2017).      [facility]     [facility] Line -  Assistance  Registered nurses in the [facility] provide clinical advisement, health education, appointment booking, and other advisory services.  Call for this free service at [phone].             Medications           Message regarding Medications     Verify the changes and/or additions to your medication regime listed below are the same as discussed with your clinician today.  If any of these changes or additions are incorrect, please notify your healthcare provider.             Verify that the below list of medications is an accurate representation of the medications you are currently taking.  If none reported, the list may be blank. If incorrect, please contact your healthcare provider. Carry this list with you in case of emergency.           Current Medications     ACETAMINOPHEN WITH CODEINE (ACETAMINOPHEN-CODEINE ORAL) Take 1 tablet by mouth 3 (three) times daily. #4    baclofen (LIORESAL) 10 MG tablet Take 1 tablet (10 mg total) by mouth 3 (three) times daily.    biotin 1 mg tablet Take 1,000 mcg by mouth once daily.    diltiazem  (CARDIZEM CD) 180 MG 24 hr capsule Take 180 mg by mouth once daily.    losartan (COZAAR) 50 MG tablet Take 50 mg by mouth once daily.    LUMIGAN 0.01 % Drop 1 drop every evening. Both eyes    pantoprazole (PROTONIX) 40 MG tablet TAKE ONE TABLET BY MOUTH ONCE DAILY           Clinical Reference Information           Your Vitals Were     BP Pulse Temp Weight    120/72 (BP Location: Right arm, Patient Position: Sitting, BP Method: Automatic) 74 97.3 °F (36.3 °C) (Tympanic) 65.1 kg (143 lb 8.3 oz)    Last Period SpO2 BMI    (LMP Unknown) 98% 23.16 kg/m2      Blood Pressure          Most Recent Value    BP  120/72      Allergies as of 3/20/2017     No Known Allergies      Immunizations Administered on Date of Encounter - 3/20/2017     None      Instructions    F/U with Dr Childs - let me know if you need RTW papers filled out. I will need a release from Dr Childs - it may come with restrictions.       Language Assistance Services     ATTENTION: Language assistance services are available, free of charge. Please call [phone].      ATENCIÓN: Si habla español, tiene a matthews disposición servicios gratuitos de asistencia lingüística. Llame al [phone].     LYNN Ý: N?u b?n nói Ti?ng Vi?t, có các d?ch v? h? tr? ngôn ng? mi?n phí dành cho b?n. G?i s? [phone].         [facility] [place] [place] [facility] complies with applicable Federal civil rights laws and does not discriminate on the basis of race, color, national origin, age, disability, or sex.

## 2017-03-20 NOTE — PATIENT INSTRUCTIONS
F/U with Dr Childs - let me know if you need RTW papers filled out. I will need a release from Dr Childs - it may come with restrictions.

## 2017-03-28 ENCOUNTER — OFFICE VISIT (OUTPATIENT)
Dept: PAIN MEDICINE | Facility: CLINIC | Age: 57
End: 2017-03-28
Payer: COMMERCIAL

## 2017-03-28 VITALS
BODY MASS INDEX: 22.82 KG/M2 | DIASTOLIC BLOOD PRESSURE: 66 MMHG | HEIGHT: 66 IN | WEIGHT: 142 LBS | HEART RATE: 83 BPM | SYSTOLIC BLOOD PRESSURE: 129 MMHG

## 2017-03-28 DIAGNOSIS — M54.17 LUMBOSACRAL RADICULOPATHY AT L3: ICD-10-CM

## 2017-03-28 DIAGNOSIS — M51.36 DDD (DEGENERATIVE DISC DISEASE), LUMBAR: ICD-10-CM

## 2017-03-28 DIAGNOSIS — M47.817 LUMBOSACRAL SPONDYLOSIS WITHOUT MYELOPATHY: Primary | ICD-10-CM

## 2017-03-28 DIAGNOSIS — M54.16 LEFT LUMBAR RADICULOPATHY: ICD-10-CM

## 2017-03-28 PROCEDURE — 99214 OFFICE O/P EST MOD 30 MIN: CPT | Mod: S$GLB,,, | Performed by: PHYSICIAN ASSISTANT

## 2017-03-28 PROCEDURE — 99999 PR PBB SHADOW E&M-EST. PATIENT-LVL III: CPT | Mod: PBBFAC,,, | Performed by: PHYSICIAN ASSISTANT

## 2017-03-28 PROCEDURE — 3074F SYST BP LT 130 MM HG: CPT | Mod: S$GLB,,, | Performed by: PHYSICIAN ASSISTANT

## 2017-03-28 PROCEDURE — 1160F RVW MEDS BY RX/DR IN RCRD: CPT | Mod: S$GLB,,, | Performed by: PHYSICIAN ASSISTANT

## 2017-03-28 PROCEDURE — 3078F DIAST BP <80 MM HG: CPT | Mod: S$GLB,,, | Performed by: PHYSICIAN ASSISTANT

## 2017-03-28 RX ORDER — ARM BRACE
1 EACH MISCELLANEOUS DAILY PRN
Qty: 1 EACH | Refills: 0 | Status: SHIPPED | OUTPATIENT
Start: 2017-03-28 | End: 2017-09-01 | Stop reason: ALTCHOICE

## 2017-03-28 NOTE — MR AVS SNAPSHOT
[facility]  [address] [address]  Phone: [phone]  Fax: [phone]                  Adelina Espitia   3/28/2017 10:00 AM   Office Visit    Description:  Female : 1960   Provider:  Misbah Childs MD   Department:  [facility]           Reason for Visit     Low-back Pain           Diagnoses this Visit        Comments    Lumbosacral spondylosis without myelopathy    -  Primary     Left lumbar radiculopathy         DDD (degenerative disc disease), lumbar         Lumbosacral radiculopathy at L3                To Do List           Future Appointments        Provider Department Dept Phone    3/31/2017 8:00 AM Monica Mendez MD [facility] [phone]      Goals (5 Years of Data)     None      Follow-Up and Disposition     Return if symptoms worsen or fail to improve.       These Medications        Disp Refills Start End    back brace Misc 1 each 0 3/28/2017     1 Device by Misc.(Non-Drug; Combo Route) route daily as needed (heavy lifting). - Misc.(Non-Drug; Combo Route)    Pharmacy: [facility]/pharmacy #[id number] [address] #: [phone]         [facility] Call     [facility] -  Assistance  Registered nurses in the [facility] provide clinical advisement, health education, appointment booking, and other advisory services.  Call for this free service at [phone].             Medications           Message regarding Medications     Verify the changes and/or additions to your medication regime listed below are the same as discussed with your clinician today.  If any of these changes or additions are incorrect, please notify your healthcare provider.        START taking these NEW medications        Refills    back brace Misc 0    Si Device by Misc.(Non-Drug; Combo Route) route daily as needed (heavy lifting).    Class: Normal    Route: Misc.(Non-Drug; Combo  "Route)           Verify that the below list of medications is an accurate representation of the medications you are currently taking.  If none reported, the list may be blank. If incorrect, please contact your healthcare provider. Carry this list with you in case of emergency.           Current Medications     baclofen (LIORESAL) 10 MG tablet Take 1 tablet (10 mg total) by mouth 3 (three) times daily.    biotin 1 mg tablet Take 1,000 mcg by mouth once daily.    diltiazem (CARDIZEM CD) 180 MG 24 hr capsule Take 180 mg by mouth once daily.    losartan (COZAAR) 50 MG tablet Take 50 mg by mouth once daily.    LUMIGAN 0.01 % Drop 1 drop every evening. Both eyes    pantoprazole (PROTONIX) 40 MG tablet TAKE ONE TABLET BY MOUTH ONCE DAILY    ACETAMINOPHEN WITH CODEINE (ACETAMINOPHEN-CODEINE ORAL) Take 1 tablet by mouth 3 (three) times daily. #4    back brace Misc 1 Device by Misc.(Non-Drug; Combo Route) route daily as needed (heavy lifting).           Clinical Reference Information           Your Vitals Were     BP Pulse Height Weight Last Period BMI    129/66 83 5' 6" (1.676 m) 64.4 kg (142 lb) (LMP Unknown) 22.92 kg/m2      Blood Pressure          Most Recent Value    BP  129/66      Allergies as of 3/28/2017     No Known Allergies      Immunizations Administered on Date of Encounter - 3/28/2017     None      Language Assistance Services     ATTENTION: Language assistance services are available, free of charge. Please call [phone].      ATENCIÓN: Si habla español, tiene a matthews disposición servicios gratuitos de asistencia lingüística. Llame al [phone].     CHÚ Ý: N?u b?n nói Ti?ng Vi?t, có các d?ch v? h? tr? ngôn ng? mi?n phí dành cho b?n. G?i s? [phone].         [facility] complies with applicable Federal civil rights laws and does not discriminate on the basis of race, color, national origin, age, disability, or sex.        "

## 2017-03-28 NOTE — PROGRESS NOTES
Chief Pain Complaint:  Low Back Pain, Left Leg Pain    History of Present Illness:   This patient is a 57 y.o. female who presents today complaining of the above noted pain/s. The patient describes the pain as follows.    - duration of pain: since ~ mid-January   - timing: intermittant in left knee  - character: aching tingling, numbness  - radiating, dermatomal: extends into left leg along L3/4  - antecedent trauma, prior spinal surgery: patient reports prior trauma, no prior spinal surgery   - pertinent negatives: No fever, No chills, No weight loss, No bladder dysfunction, No bowel dysfunction, No saddle anesthesia  - pertinent positives: left leg weakness    - medications, other therapies tried (physical therapy, injections):     >> Tylenol, baclofen, medrol dose pack, tylenol #4    >> Has previously undergone Physical Therapy    >> Has NOT previously undergone spinal injection/s      Imaging / Labs / Studies (reviewed on 3/28/2017):     3/17/17 MRI LUMBAR SPINE WITHOUT CONTRAST  History:  Low back pain with left leg pain and radiculopathy  Standard multiplanar noncontrast MRI sequences of the lumbar spine.  The distal cord and conus reveal normal signal and morphology. The lumbar vertebra reveal normal alignment, shape and signal intensity.  T12-L1: Unremarkable  L1-2: Unremarkable  L2-3: Mild generalized disc bulge, slightly eccentric to the left.  Mild left foraminal stenosis.  L3-4: Mild generalized disc bulge.  Focal soft tissue signal intensity lesion is seen far laterally on the left, best seen on axial image 18.  Possible far lateral disc extrusion.  Positive mass effect upon the exiting left L3 nerve root.  Please correlate with clinical exam.  L4-5: Moderate degenerative disc disease with disc desiccation with endplate marrow signal changes.  Disc bulge eccentric to the right with right foraminal annular fissure.  Mild central stenosis with mild to moderate right and mild left foraminal  "stenosis.  L5-S1:  Minor disc desiccation with disc bulge.       1-31-17 xr lumbar:  Findings: There is anatomic spinal alignment.  No translational instability on flexion or extension.  Mild degenerative disc space narrowing and facet arthropathy at L4-L5 and L5-S1.  Pedicles are intact.  No vertebral compression fracture.           Review of Systems:  CONSTITUTIONAL: patient denies any fever, chills, or weight loss  SKIN: patient denies any rash or itching  RESPIRATORY: patient denies having any shortness of breath  GASTROINTESTINAL: patient denied constipation or diarrhea  GENITOURINARY: patient denies having any abnormal bladder function    MUSCULOSKELETAL:  - patient complains of the above noted pain/s (see chief pain complaint)    NEUROLOGICAL:   - pain as above  - strength in Lower extremities is decreased, on the LEFT  - sensation in Lower extremities is intact, BILATERALLY  - patient denies any loss of bowel or bladder control      PSYCHIATRIC: patient denies any change in mood    Other:  All other systems reviewed and are negative      Physical Exam:    Vitals:  /66  Pulse 83  Ht 5' 6" (1.676 m)  Wt 64.4 kg (142 lb)  LMP  (LMP Unknown)  BMI 22.92 kg/m2   (reviewed on 3/28/2017)    General: alert and oriented, in no apparent distress  Gait: antalgic gait  Skin: No rashes, No discoloration, No obvious lesions  HEENT: EOMI  Cardiovascular: no significant peripheral edema present  Respiratory: respirations nonlabored    Musculoskeletal:  - Any pain on flexion, extension, rotation:    >> pain on extension and rotation  - Straight Leg Raise:     >> LEFT :: positive    >> RIGHT :: negative  - Any tenderness to palpation across paraspinal muscles, joints, bursae:     >> across lumbar paraspinals    Neuro:  - Extremity Strength:     >> LEFT :: ~4/5, worse with hip extension    >> RIGHT :: 5/5    Psych:  Mood and affect is appropriate      Assessment:  Lumbar Radiculopathy, left L3  Lumbar " Spondylosis  Lumbar DDD  Chronic Pain Syndrome        Plan:  Patient returns for follow-up. She complains of left leg pain since approx mid-January.  She reports that pain continues to improve, and weakness has lessened as well.   - Lumbar MRI reviewed today, detailed above, showing left L3 nerve impingement.   - We discussed options for treatment, including lumbar TF ERIKA, which she wants to hold off on.  - Order back brace for heavy lifting at her job. 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 I encouraged her to avoid heavy lifting.   RTC PRN. I discussed the risks, benefits, and alternatives to potential treatment options. All questions and concerns were fully addressed today in clinic. Dr. Childs was consulted regarding the patient plan and agrees.            >>PDI:  3/14/2017 :: 28  3/28/2017 :: 27    >>Opioid Risk Tool:  1/31/2017 :: 0

## 2017-03-31 ENCOUNTER — OFFICE VISIT (OUTPATIENT)
Dept: INTERNAL MEDICINE | Facility: CLINIC | Age: 57
End: 2017-03-31
Payer: COMMERCIAL

## 2017-03-31 VITALS
OXYGEN SATURATION: 99 % | WEIGHT: 141.44 LBS | SYSTOLIC BLOOD PRESSURE: 127 MMHG | TEMPERATURE: 98 F | BODY MASS INDEX: 22.73 KG/M2 | HEART RATE: 64 BPM | HEIGHT: 66 IN | DIASTOLIC BLOOD PRESSURE: 73 MMHG

## 2017-03-31 DIAGNOSIS — M54.16 LEFT LUMBAR RADICULOPATHY: Primary | ICD-10-CM

## 2017-03-31 DIAGNOSIS — R29.898 WEAKNESS OF LEFT LOWER EXTREMITY: ICD-10-CM

## 2017-03-31 PROCEDURE — 3074F SYST BP LT 130 MM HG: CPT | Mod: S$GLB,,, | Performed by: FAMILY MEDICINE

## 2017-03-31 PROCEDURE — 1160F RVW MEDS BY RX/DR IN RCRD: CPT | Mod: S$GLB,,, | Performed by: FAMILY MEDICINE

## 2017-03-31 PROCEDURE — 99999 PR PBB SHADOW E&M-EST. PATIENT-LVL III: CPT | Mod: PBBFAC,,, | Performed by: FAMILY MEDICINE

## 2017-03-31 PROCEDURE — 99213 OFFICE O/P EST LOW 20 MIN: CPT | Mod: S$GLB,,, | Performed by: FAMILY MEDICINE

## 2017-03-31 PROCEDURE — 3078F DIAST BP <80 MM HG: CPT | Mod: S$GLB,,, | Performed by: FAMILY MEDICINE

## 2017-03-31 NOTE — PROGRESS NOTES
Subjective:       Patient ID: Adelina Espitia is a 57 y.o. female.    Chief Complaint: Back Pain    HPI Comments: She is here for return to work evaluation.  She has been out of work since early January.  She has a release to return to work from Dr. Childs's office dated 3/28/17.  She states they have ordered a back brace for her to wear when carrying heavy loads.  She is continuing in physical therapy at [facility] [address].  She still has Norco 5 but is not using it.  However yesterday she did have some pain to her left lower extremity from just above the knee to just below the knee and used it.  She occasionally uses baclofen as well.  She does have some numbness to her left knee.  She continues to have decreased strength in her left lower extremity.  This appears to be in the quadriceps and hip musculature.  However, she says this is less than it was previously.  Her pain is much improved.  She states she always has an increase in pain after she is examined by the doctor.    Review of Systems   Musculoskeletal: Positive for back pain (currently resolved) and myalgias.   Neurological: Positive for weakness.       Objective:      Physical Exam   Constitutional: She is oriented to person, place, and time. She appears well-developed.   HENT:   Head: Normocephalic and atraumatic.   Cardiovascular: Normal rate, regular rhythm and normal heart sounds.    Pulmonary/Chest: Effort normal and breath sounds normal.   Neurological: She is alert and oriented to person, place, and time.   Skin: Skin is warm and dry.   Psychiatric: She has a normal mood and affect. Her behavior is normal.         Assessment/Plan:     1. Left lumbar radiculopathy     2. Weakness of left lower extremity       Note for RTW given - she will call if she has not heard re back brace from Dr Childs's office.  Will RTW  4/3/17 - continue PT for strengthening. Monitor closely for worsening. Surgical evaluation for increased  weakness.  More than 50% of visit was spent in counseling regarding options, medication use, expectations, and precautions.  Total time spent face-to-face was 20 minutes.

## 2017-03-31 NOTE — LETTER
March 31, 2017    Adelina Espitia  [address] [place] [address]         [address]  [place] [address]  Phone: [phone]  Fax: [phone] March 31, 2017     Patient: Adelina Espitia   YOB: 1960   Date of Visit: 3/31/2017       To Whom It May Concern:    It is my medical opinion that Adelina Espitia may return to work on 4/3/17. She is not to lift items over 25 lb..    If you have any questions or concerns, please don't hesitate to call.    Sincerely,        Monica Mendez MD

## 2017-04-13 ENCOUNTER — PATIENT OUTREACH (OUTPATIENT)
Dept: ADMINISTRATIVE | Facility: HOSPITAL | Age: 57
End: 2017-04-13

## 2017-04-13 NOTE — LETTER
April 13, 2017    Adelina Mart Omkar  [address] [place] [address]             [facility]  [address]  [facility] [address]  Phone: [phone] Dear Mrs. Espitia:    [facility] is committed to your overall health.  To help you get the most out of each of your visits, we will review your information to make sure you are up to date on all of your recommended tests and/or procedures.      Monica Mendez MD has found that you may be due for   Health Maintenance Due   Topic    Hepatitis C Screening     TETANUS VACCINE     Influenza Vaccine     Lipid Panel         If you have had any of the above done at another facility, please bring the records or information with you so that your record at [facility] will be complete.    If you are currently taking medication, please bring it with you to your appointment for review.    We will be happy to assist you with scheduling any necessary appointments or you may contact the [facility] appointment desk at [phone] to schedule at your convenience.     Thank you for choosing [facility] for your healthcare needs. If you have any questions or concerns, please don't hesitate to call.    Sincerely,  Carmenza DICKENS LPN Care Coordinator  [facility]

## 2017-05-02 ENCOUNTER — OFFICE VISIT (OUTPATIENT)
Dept: INTERNAL MEDICINE | Facility: CLINIC | Age: 57
End: 2017-05-02
Payer: COMMERCIAL

## 2017-05-02 VITALS
SYSTOLIC BLOOD PRESSURE: 116 MMHG | WEIGHT: 143.31 LBS | OXYGEN SATURATION: 96 % | TEMPERATURE: 98 F | HEIGHT: 66 IN | DIASTOLIC BLOOD PRESSURE: 66 MMHG | HEART RATE: 61 BPM | BODY MASS INDEX: 23.03 KG/M2

## 2017-05-02 DIAGNOSIS — M54.16 LEFT LUMBAR RADICULOPATHY: Primary | ICD-10-CM

## 2017-05-02 PROCEDURE — 3078F DIAST BP <80 MM HG: CPT | Mod: S$GLB,,, | Performed by: FAMILY MEDICINE

## 2017-05-02 PROCEDURE — 99999 PR PBB SHADOW E&M-EST. PATIENT-LVL III: CPT | Mod: PBBFAC,,, | Performed by: FAMILY MEDICINE

## 2017-05-02 PROCEDURE — 99212 OFFICE O/P EST SF 10 MIN: CPT | Mod: S$GLB,,, | Performed by: FAMILY MEDICINE

## 2017-05-02 PROCEDURE — 3074F SYST BP LT 130 MM HG: CPT | Mod: S$GLB,,, | Performed by: FAMILY MEDICINE

## 2017-05-02 PROCEDURE — 1160F RVW MEDS BY RX/DR IN RCRD: CPT | Mod: S$GLB,,, | Performed by: FAMILY MEDICINE

## 2017-05-02 RX ORDER — DORZOLAMIDE HYDROCHLORIDE AND TIMOLOL MALEATE 20; 5 MG/ML; MG/ML
1 SOLUTION/ DROPS OPHTHALMIC 2 TIMES DAILY
Refills: 4 | COMMUNITY
Start: 2017-04-11

## 2017-05-02 NOTE — PROGRESS NOTES
Subjective:       Patient ID: Adelina Espitia is a 57 y.o. female.    Chief Complaint: Back Pain    HPI Comments: She is here for recheck on LBP. She has been back to work a month - working full time.  She finsihed PT last week - she has HEP. She went out of town this past weekend and had flare in her left hip - better now down to a 2/10 from 4/10 over the weekend. She took one ibuprofen and it helped . She is not taking any meds on a daily basis. She is wearing a back brace only at work.    She feels she is doing well - still on restricted wt bearing of 25 lbs max. States that is max anyway in her workplace per there rules.    She does not notice any LLE weakness.    Back Pain   Pertinent negatives include no weakness.     Review of Systems   Musculoskeletal: Positive for back pain and myalgias. Negative for arthralgias and gait problem.   Neurological: Negative for weakness.       Objective:      Physical Exam   Constitutional: She is oriented to person, place, and time. She appears well-developed.   HENT:   Head: Normocephalic and atraumatic.   Cardiovascular: Normal rate, regular rhythm and normal heart sounds.    Pulmonary/Chest: Effort normal and breath sounds normal. No respiratory distress. She has no wheezes.   Neurological: She is alert and oriented to person, place, and time.   Slight weakness to L knee, ankle and toes on extension , about -5/5 with 5/5 on right.     Skin: Skin is warm and dry.   Psychiatric: She has a normal mood and affect. Her behavior is normal.         Assessment/Plan:     1. Left lumbar radiculopathy      controlled on HEP   reviewed use of correct posture for lifting, etc.  No changes - continue HEP - underscored need to do this daily.  Return for any changes. Recheck 4 months.

## 2017-08-04 NOTE — ADDENDUM NOTE
Encounter addended by: Arlyn Soriano LPN on: 8/4/2017 11:08 AM<BR>    Actions taken: Letter status changed

## 2017-09-01 ENCOUNTER — OFFICE VISIT (OUTPATIENT)
Dept: INTERNAL MEDICINE | Facility: CLINIC | Age: 57
End: 2017-09-01
Payer: COMMERCIAL

## 2017-09-01 VITALS
HEART RATE: 65 BPM | BODY MASS INDEX: 22.58 KG/M2 | SYSTOLIC BLOOD PRESSURE: 117 MMHG | OXYGEN SATURATION: 96 % | TEMPERATURE: 98 F | DIASTOLIC BLOOD PRESSURE: 65 MMHG | WEIGHT: 139.88 LBS

## 2017-09-01 DIAGNOSIS — Z11.59 NEED FOR HEPATITIS C SCREENING TEST: ICD-10-CM

## 2017-09-01 DIAGNOSIS — Z00.00 WELLNESS EXAMINATION: Primary | ICD-10-CM

## 2017-09-01 DIAGNOSIS — I10 HYPERTENSION, ESSENTIAL: ICD-10-CM

## 2017-09-01 DIAGNOSIS — Z23 IMMUNIZATION DUE: ICD-10-CM

## 2017-09-01 LAB
ANION GAP SERPL CALC-SCNC: 5 MMOL/L
BASOPHILS # BLD AUTO: 0.01 K/UL
BASOPHILS NFR BLD: 0.2 %
BUN SERPL-MCNC: 21 MG/DL
CALCIUM SERPL-MCNC: 9.1 MG/DL
CHLORIDE SERPL-SCNC: 105 MMOL/L
CO2 SERPL-SCNC: 31 MMOL/L
CREAT SERPL-MCNC: 1 MG/DL
DIFFERENTIAL METHOD: ABNORMAL
EOSINOPHIL # BLD AUTO: 0.1 K/UL
EOSINOPHIL NFR BLD: 1 %
ERYTHROCYTE [DISTWIDTH] IN BLOOD BY AUTOMATED COUNT: 13.9 %
EST. GFR  (AFRICAN AMERICAN): >60 ML/MIN/1.73 M^2
EST. GFR  (NON AFRICAN AMERICAN): >60 ML/MIN/1.73 M^2
GLUCOSE SERPL-MCNC: 91 MG/DL
HCT VFR BLD AUTO: 38.8 %
HGB BLD-MCNC: 12.9 G/DL
LYMPHOCYTES # BLD AUTO: 1.9 K/UL
LYMPHOCYTES NFR BLD: 37.6 %
MCH RBC QN AUTO: 31.1 PG
MCHC RBC AUTO-ENTMCNC: 33.2 G/DL
MCV RBC AUTO: 94 FL
MONOCYTES # BLD AUTO: 0.4 K/UL
MONOCYTES NFR BLD: 7.8 %
NEUTROPHILS # BLD AUTO: 2.7 K/UL
NEUTROPHILS NFR BLD: 53.4 %
PLATELET # BLD AUTO: 230 K/UL
PMV BLD AUTO: 11 FL
POTASSIUM SERPL-SCNC: 3.9 MMOL/L
RBC # BLD AUTO: 4.15 M/UL
SODIUM SERPL-SCNC: 141 MMOL/L
WBC # BLD AUTO: 4.98 K/UL

## 2017-09-01 PROCEDURE — 90471 IMMUNIZATION ADMIN: CPT | Mod: S$GLB,,, | Performed by: FAMILY MEDICINE

## 2017-09-01 PROCEDURE — 99396 PREV VISIT EST AGE 40-64: CPT | Mod: 25,S$GLB,, | Performed by: FAMILY MEDICINE

## 2017-09-01 PROCEDURE — 80048 BASIC METABOLIC PNL TOTAL CA: CPT

## 2017-09-01 PROCEDURE — 90715 TDAP VACCINE 7 YRS/> IM: CPT | Mod: S$GLB,,, | Performed by: FAMILY MEDICINE

## 2017-09-01 PROCEDURE — 85025 COMPLETE CBC W/AUTO DIFF WBC: CPT

## 2017-09-01 PROCEDURE — 99999 PR PBB SHADOW E&M-EST. PATIENT-LVL III: CPT | Mod: PBBFAC,,, | Performed by: FAMILY MEDICINE

## 2017-09-01 PROCEDURE — 86803 HEPATITIS C AB TEST: CPT

## 2017-09-01 RX ORDER — PANTOPRAZOLE SODIUM 40 MG/1
40 TABLET, DELAYED RELEASE ORAL DAILY
COMMUNITY
End: 2018-11-15

## 2017-09-01 NOTE — PROGRESS NOTES
Subjective:       Patient ID: Adelina Espitia is a 57 y.o. female.    Chief Complaint: Follow-up (back, side and leg pain)    She is here for f/u on left sided LBP with radiation left - she is having no pain - doing HEP 3 x week - more if she should have some pain.  She is due for annual. Dr Swanson cardiology checks lipids and she gets mammo with gyn.    ANNUAL EXAM:  Preventative Health Checklist 24-64 years of age    Adelina Espitia presents today for an annual exam.    Hepatitis C Screening due on 1960  TETANUS VACCINE due on 03/28/1976  Mammogram due on 02/02/2017  Lipid Panel due on 02/02/2017  Influenza Vaccine due on 08/01/2017    Health Maintenance Summary                Hepatitis C Screening Overdue 1960     TETANUS VACCINE Overdue 3/28/1976     Mammogram Overdue 2/2/2017      Not Clinically Appropriate 2/2/2016 gyn    Lipid Panel Overdue 2/2/2017      Not Clinically Appropriate 2/2/2016 sees cards    Influenza Vaccine Overdue 8/1/2017      Done 10/28/2014 Imm Admin: influenza - Quadrivalent    Colonoscopy Next Due 6/8/2026      Done 6/8/2016 DR ALEX CASE/[facility]. DIVERTICULOSIS ASCENDING,   SIGMOID & DESCENDING COLON. REPEAT 5-10YRS     Done 1/1/2007 Dr Case        Counseling:  Nutrition: Encouraged to take 5-10 servings fruits and vegetables daily   Exercise:  Physical activity recommendations reviewed and given hand out - active work and HEP for back  Avoid Tobacco Use: reviewed  Avoid ETOH/Drug Use:  reviewed  STD Prevention/Abstinence:   Avoid High Risk Behavior:   Unintended Pregnancy:    Lap-shoulder Belts:  Yes  No text/talk while driving: Reviewed  Bike/ATV Motorcycle Helmets:  N/A  Smoke Detector:  yes  Safe Storage/Removal of Firearms: reviewed    Calcium Intake (females):  Calcium recommendations given with handout  Regular Dental Visits:  yes  Floss, Brush and Fluoride: yes    She has completed a full 14 system review. All items are negative.        Review of Systems    Constitutional: Negative.    HENT: Negative.    Eyes: Negative.    Respiratory: Negative.    Cardiovascular: Negative.    Gastrointestinal: Negative.    Endocrine: Negative.    Genitourinary: Negative.  Frequency: occasionally - but it resolves in a few days.   Musculoskeletal: Negative.    Skin: Negative.    Allergic/Immunologic: Negative.    Neurological: Negative.    Hematological: Negative.    Psychiatric/Behavioral: Negative.        Objective:      Physical Exam   Constitutional: She is oriented to person, place, and time. She appears well-developed and well-nourished.   HENT:   Head: Normocephalic and atraumatic.   Right Ear: Tympanic membrane, external ear and ear canal normal.   Left Ear: Tympanic membrane, external ear and ear canal normal.   Nose: Nose normal.   Mouth/Throat: Oropharynx is clear and moist. No oropharyngeal exudate.   Eyes: Conjunctivae and EOM are normal.   Neck: Normal range of motion. Neck supple. No thyromegaly present.   Cardiovascular: Normal rate, regular rhythm and normal heart sounds.  Exam reveals no gallop and no friction rub.    No murmur heard.  Pulmonary/Chest: Effort normal and breath sounds normal. She exhibits no tenderness.   Abdominal: Soft. She exhibits no distension. There is no tenderness.   Musculoskeletal: She exhibits no edema.   Lymphadenopathy:     She has no cervical adenopathy.   Neurological: She is alert and oriented to person, place, and time.   Skin: Skin is warm and dry.   Psychiatric: She has a normal mood and affect. Her behavior is normal.         Assessment/Plan:     1. Wellness examination  Basic metabolic panel    CBC auto differential    Tdap Vaccine    Hepatitis C antibody   2. Hypertension, essential  Basic metabolic panel    CBC auto differential   3. Need for hepatitis C screening test  Hepatitis C antibody   4. Immunization due  Tdap Vaccine     Releases signed for mammo and lipids.

## 2017-09-04 LAB — HCV AB SERPL QL IA: NEGATIVE

## 2018-05-08 ENCOUNTER — OFFICE VISIT (OUTPATIENT)
Dept: INTERNAL MEDICINE | Facility: CLINIC | Age: 58
End: 2018-05-08
Payer: COMMERCIAL

## 2018-05-08 VITALS
TEMPERATURE: 98 F | DIASTOLIC BLOOD PRESSURE: 84 MMHG | SYSTOLIC BLOOD PRESSURE: 143 MMHG | HEART RATE: 65 BPM | WEIGHT: 139.75 LBS | BODY MASS INDEX: 22.56 KG/M2 | OXYGEN SATURATION: 99 %

## 2018-05-08 DIAGNOSIS — J06.9 URI, ACUTE: ICD-10-CM

## 2018-05-08 DIAGNOSIS — M25.559 ISCHIAL PAIN, UNSPECIFIED LATERALITY: Primary | ICD-10-CM

## 2018-05-08 PROCEDURE — 99213 OFFICE O/P EST LOW 20 MIN: CPT | Mod: S$GLB,,, | Performed by: FAMILY MEDICINE

## 2018-05-08 PROCEDURE — 99999 PR PBB SHADOW E&M-EST. PATIENT-LVL III: CPT | Mod: PBBFAC,,, | Performed by: FAMILY MEDICINE

## 2018-05-08 PROCEDURE — 3079F DIAST BP 80-89 MM HG: CPT | Mod: CPTII,S$GLB,, | Performed by: FAMILY MEDICINE

## 2018-05-08 PROCEDURE — 3077F SYST BP >= 140 MM HG: CPT | Mod: CPTII,S$GLB,, | Performed by: FAMILY MEDICINE

## 2018-05-08 PROCEDURE — 3008F BODY MASS INDEX DOCD: CPT | Mod: CPTII,S$GLB,, | Performed by: FAMILY MEDICINE

## 2018-05-08 RX ORDER — PREDNISONE 5 MG/1
TABLET ORAL
Qty: 36 TABLET | Refills: 0 | Status: SHIPPED | OUTPATIENT
Start: 2018-05-08 | End: 2018-11-15 | Stop reason: ALTCHOICE

## 2018-05-08 NOTE — PROGRESS NOTES
Subjective:       Patient ID: Adelina Espitia is a 58 y.o. female.    Chief Complaint: uncomfortable feeling underneath buttock area and Sinus Problem    Here with two concerns: since February has pain to sit bones 3/10 with sitting. It has been worsening and now she feels it when walking - just mildly.has not used any OTC meds but when she took ES tylenol it decreased the sitbone pain.    Also c/o HA 3 days ago which was severe, but resolved with tylenol. She feels it was due to her sinuses.      Sinus Problem   This is a new problem. The current episode started in the past 7 days. There has been no fever. Her pain is at a severity of 10/10. Associated symptoms include congestion, headaches (frontal) and sneezing. Pertinent negatives include no chills, coughing, ear pain, shortness of breath, sinus pressure or sore throat.     Review of Systems   Constitutional: Negative for chills.   HENT: Positive for congestion and sneezing. Negative for ear pain, sinus pressure and sore throat.    Respiratory: Negative for cough and shortness of breath.    Neurological: Positive for headaches (frontal).       Objective:      Physical Exam   Constitutional: She is oriented to person, place, and time. She appears well-developed and well-nourished.   HENT:   Head: Normocephalic and atraumatic.   Right Ear: Tympanic membrane, external ear and ear canal normal.   Left Ear: Tympanic membrane, external ear and ear canal normal.   Nose: Nose normal.   Mouth/Throat: Oropharynx is clear and moist. No oropharyngeal exudate.   Eyes: Conjunctivae and EOM are normal.   Neck: Normal range of motion. Neck supple. No thyromegaly present.   Cardiovascular: Normal rate, regular rhythm and normal heart sounds.  Exam reveals no gallop and no friction rub.    No murmur heard.  Pulmonary/Chest: Effort normal and breath sounds normal. She exhibits no tenderness.   Abdominal: Soft. She exhibits no distension. There is no tenderness.    Musculoskeletal: She exhibits no edema.   Lymphadenopathy:     She has no cervical adenopathy.   Neurological: She is alert and oriented to person, place, and time.   Skin: Skin is warm and dry.   Psychiatric: She has a normal mood and affect. Her behavior is normal.         Assessment/Plan:     1. Ischial pain, unspecified laterality     2. URI, acute  predniSONE (DELTASONE) 5 MG tablet     Trial tylenol as it helped in past.  We'll give her a round of prednisone for her URI/sinus symptoms and monitor for effect on his usual pain.  If this pain persists she may need an imaging study.

## 2018-05-08 NOTE — PATIENT INSTRUCTIONS
If needed after the dose pack, you may take two ES Tylenol (acetaminophen) 500mg twice daily for 10 days and continue to monitor.

## 2018-10-29 ENCOUNTER — IMMUNIZATION (OUTPATIENT)
Dept: INTERNAL MEDICINE | Facility: CLINIC | Age: 58
End: 2018-10-29
Payer: COMMERCIAL

## 2018-10-29 PROCEDURE — 99999 PR PBB SHADOW E&M-EST. PATIENT-LVL I: CPT | Mod: PBBFAC,,,

## 2018-10-29 PROCEDURE — 90686 IIV4 VACC NO PRSV 0.5 ML IM: CPT | Mod: S$GLB,,, | Performed by: FAMILY MEDICINE

## 2018-10-29 PROCEDURE — 90471 IMMUNIZATION ADMIN: CPT | Mod: S$GLB,,, | Performed by: FAMILY MEDICINE

## 2018-11-14 ENCOUNTER — PATIENT OUTREACH (OUTPATIENT)
Dept: ADMINISTRATIVE | Facility: HOSPITAL | Age: 58
End: 2018-11-14

## 2018-11-15 ENCOUNTER — OFFICE VISIT (OUTPATIENT)
Dept: INTERNAL MEDICINE | Facility: CLINIC | Age: 58
End: 2018-11-15
Payer: COMMERCIAL

## 2018-11-15 VITALS
HEIGHT: 66 IN | HEART RATE: 59 BPM | SYSTOLIC BLOOD PRESSURE: 128 MMHG | OXYGEN SATURATION: 100 % | TEMPERATURE: 98 F | BODY MASS INDEX: 21.99 KG/M2 | WEIGHT: 136.81 LBS | DIASTOLIC BLOOD PRESSURE: 56 MMHG

## 2018-11-15 DIAGNOSIS — K21.9 GERD WITH STRICTURE: ICD-10-CM

## 2018-11-15 DIAGNOSIS — K22.2 GERD WITH STRICTURE: ICD-10-CM

## 2018-11-15 DIAGNOSIS — H40.9 GLAUCOMA OF BOTH EYES, UNSPECIFIED GLAUCOMA TYPE: ICD-10-CM

## 2018-11-15 DIAGNOSIS — I10 HYPERTENSION, ESSENTIAL: Primary | ICD-10-CM

## 2018-11-15 PROBLEM — M54.40 ACUTE MIDLINE LOW BACK PAIN WITH SCIATICA: Status: RESOLVED | Noted: 2017-02-06 | Resolved: 2018-11-15

## 2018-11-15 PROBLEM — M54.50 ACUTE LEFT-SIDED LOW BACK PAIN WITHOUT SCIATICA: Status: RESOLVED | Noted: 2017-01-25 | Resolved: 2018-11-15

## 2018-11-15 PROCEDURE — 3078F DIAST BP <80 MM HG: CPT | Mod: CPTII,S$GLB,, | Performed by: FAMILY MEDICINE

## 2018-11-15 PROCEDURE — 3074F SYST BP LT 130 MM HG: CPT | Mod: CPTII,S$GLB,, | Performed by: FAMILY MEDICINE

## 2018-11-15 PROCEDURE — 99999 PR PBB SHADOW E&M-EST. PATIENT-LVL III: CPT | Mod: PBBFAC,,, | Performed by: FAMILY MEDICINE

## 2018-11-15 PROCEDURE — 99396 PREV VISIT EST AGE 40-64: CPT | Mod: S$GLB,,, | Performed by: FAMILY MEDICINE

## 2018-11-15 NOTE — PROGRESS NOTES
"Chief Complaint: Hypertension    HPI    ANNUAL EXAM:  Preventative Health Checklist 24-64 years of age    Adelina Espitia presents today with no complaints for an annual exam.    No longer having ischial pain after stopping hormone injections. Steroid pack did not help    Went to PT for acute low back pain, resolved    Counseling given on 11/15/2018    Labs/Screenings:    Females:  Pap (if sexually active and has cervix):    Mammogram (consider after 40):  [facility]    Males/Females:  Total Blood Cholesterol:  No results   Fecal Occult Blood (50+):  na  Colonoscopy (50+):  Performed   Assess for Problem Drinking:  reviewed  HCV Screening ( -):  neg    Immunizations:  Tetanus-Diptheria (Td) Booster:  2017  Tdap:   Rubella (females, childbearing age):   Shingles Vaccine (60+):  due  PNV (if indicated):  n/a  Flu: utd    Review of Systems     Pt has completed a full 14 system review. All items are negative except as indicated.  Hot flashes, started on black cohosh  Objective:   BP (!) 128/56   Pulse (!) 59   Temp 97.9 °F (36.6 °C) (Oral)   Ht 5' 6" (1.676 m)   Wt 62.1 kg (136 lb 12.7 oz)   LMP  (LMP Unknown)   SpO2 100%   BMI 22.08 kg/m²     Physical Exam   Constitutional: She is oriented to person, place, and time. She appears well-nourished. No distress.   HENT:   Head: Normocephalic and atraumatic.   Mouth/Throat: Oropharynx is clear and moist.   Eyes: Conjunctivae and EOM are normal. No scleral icterus.   Neck: Normal range of motion. Neck supple. No thyromegaly present.   Cardiovascular: Normal rate, regular rhythm and normal heart sounds.   No murmur heard.  Pulmonary/Chest: Effort normal and breath sounds normal. She has no wheezes.   Abdominal: Soft. Bowel sounds are normal. There is no tenderness.   Musculoskeletal: She exhibits no edema or deformity.   Neurological: She is alert and oriented to person, place, and time.   Skin: Skin is warm and dry.   Psychiatric: She has a " normal mood and affect. Her behavior is normal.   Vitals reviewed.    Assessment:     1. Hypertension, essential    2. GERD with stricture    3. Glaucoma of both eyes, unspecified glaucoma type      Plan:     Problem List Items Addressed This Visit        Ophtho    Glaucoma of both eyes    Current Assessment & Plan     Drops BID, last administered this AM            Cardiac/Vascular    Hypertension, essential - Primary    Current Assessment & Plan     On dilt and cozaar. Reports compliance. Using eye drops w/ BB potential         Relevant Orders    Lipid panel    Basic metabolic panel    CBC auto differential       GI    GERD with stricture    Current Assessment & Plan     No longer taking protonix, currently asx               Counseling:  Nutrition: Encouraged to take 5-10 servings fruits and vegetables daily   Exercise:  Physical activity recommendations reviewed and given hand out  Avoid Tobacco Use: reviewed  Avoid ETOH/Drug Use:  reviewed  STD Prevention/Abstinence: reviewed  Avoid High Risk Behavior: reviewed  Unintended Pregnancy:    Lap-shoulder Belts:  Yes  No text/talk while driving: Reviewed  Bike/ATV Motorcycle Helmets:  N/A  Smoke Detector:  yes  Safe Storage/Removal of Firearms: reviewed    Calcium Intake (females):  reviewed  Regular Dental Visits:  yes  Floss, Brush and Fluoride: yes    Follow-up in about 1 year (around 11/15/2019).

## 2018-11-15 NOTE — PROGRESS NOTES
Subjective:       Patient ID: Adelina Espitia is a 58 y.o. female.    Chief Complaint: No chief complaint on file.    HPI    Review of Systems     Objective:   LMP  (LMP Unknown)     Physical Exam  Assessment:     No diagnosis found.  Plan:     Problem List Items Addressed This Visit     None          No Follow-up on file.

## 2018-11-16 ENCOUNTER — CLINICAL SUPPORT (OUTPATIENT)
Dept: INTERNAL MEDICINE | Facility: CLINIC | Age: 58
End: 2018-11-16
Payer: COMMERCIAL

## 2018-11-16 DIAGNOSIS — I10 HYPERTENSION, ESSENTIAL: ICD-10-CM

## 2018-11-16 LAB
ANION GAP SERPL CALC-SCNC: 7 MMOL/L
BASOPHILS # BLD AUTO: 0.02 K/UL
BASOPHILS NFR BLD: 0.8 %
BUN SERPL-MCNC: 20 MG/DL
CALCIUM SERPL-MCNC: 9.5 MG/DL
CHLORIDE SERPL-SCNC: 105 MMOL/L
CHOLEST SERPL-MCNC: 194 MG/DL
CHOLEST/HDLC SERPL: 2.2 {RATIO}
CO2 SERPL-SCNC: 29 MMOL/L
CREAT SERPL-MCNC: 0.8 MG/DL
DIFFERENTIAL METHOD: ABNORMAL
EOSINOPHIL # BLD AUTO: 0 K/UL
EOSINOPHIL NFR BLD: 0.8 %
ERYTHROCYTE [DISTWIDTH] IN BLOOD BY AUTOMATED COUNT: 13.4 %
EST. GFR  (AFRICAN AMERICAN): >60 ML/MIN/1.73 M^2
EST. GFR  (NON AFRICAN AMERICAN): >60 ML/MIN/1.73 M^2
GLUCOSE SERPL-MCNC: 86 MG/DL
HCT VFR BLD AUTO: 40.7 %
HDLC SERPL-MCNC: 87 MG/DL
HDLC SERPL: 44.8 %
HGB BLD-MCNC: 13.2 G/DL
IMM GRANULOCYTES # BLD AUTO: 0 K/UL
IMM GRANULOCYTES NFR BLD AUTO: 0 %
LDLC SERPL CALC-MCNC: 98.2 MG/DL
LYMPHOCYTES # BLD AUTO: 1.1 K/UL
LYMPHOCYTES NFR BLD: 45.4 %
MCH RBC QN AUTO: 31.4 PG
MCHC RBC AUTO-ENTMCNC: 32.4 G/DL
MCV RBC AUTO: 97 FL
MONOCYTES # BLD AUTO: 0.2 K/UL
MONOCYTES NFR BLD: 8.4 %
NEUTROPHILS # BLD AUTO: 1.1 K/UL
NEUTROPHILS NFR BLD: 44.6 %
NONHDLC SERPL-MCNC: 107 MG/DL
NRBC BLD-RTO: 0 /100 WBC
PLATELET # BLD AUTO: 228 K/UL
PMV BLD AUTO: 10.8 FL
POTASSIUM SERPL-SCNC: 4.1 MMOL/L
RBC # BLD AUTO: 4.21 M/UL
SODIUM SERPL-SCNC: 141 MMOL/L
TRIGL SERPL-MCNC: 44 MG/DL
WBC # BLD AUTO: 2.51 K/UL

## 2018-11-16 PROCEDURE — 80061 LIPID PANEL: CPT

## 2018-11-16 PROCEDURE — 99999 PR PBB SHADOW E&M-EST. PATIENT-LVL I: CPT | Mod: PBBFAC,,,

## 2018-11-16 PROCEDURE — 80048 BASIC METABOLIC PNL TOTAL CA: CPT

## 2018-11-16 PROCEDURE — 85025 COMPLETE CBC W/AUTO DIFF WBC: CPT

## 2019-02-14 ENCOUNTER — OFFICE VISIT (OUTPATIENT)
Dept: INTERNAL MEDICINE | Facility: CLINIC | Age: 59
End: 2019-02-14
Payer: COMMERCIAL

## 2019-02-14 VITALS
SYSTOLIC BLOOD PRESSURE: 122 MMHG | BODY MASS INDEX: 23.87 KG/M2 | HEIGHT: 66 IN | TEMPERATURE: 100 F | OXYGEN SATURATION: 99 % | WEIGHT: 148.5 LBS | HEART RATE: 69 BPM | DIASTOLIC BLOOD PRESSURE: 59 MMHG

## 2019-02-14 DIAGNOSIS — M54.50 ACUTE RIGHT-SIDED LOW BACK PAIN WITHOUT SCIATICA: Primary | ICD-10-CM

## 2019-02-14 DIAGNOSIS — J30.2 SEASONAL ALLERGIES: ICD-10-CM

## 2019-02-14 PROCEDURE — 99999 PR PBB SHADOW E&M-EST. PATIENT-LVL III: ICD-10-PCS | Mod: PBBFAC,,, | Performed by: FAMILY MEDICINE

## 2019-02-14 PROCEDURE — 99999 PR PBB SHADOW E&M-EST. PATIENT-LVL III: CPT | Mod: PBBFAC,,, | Performed by: FAMILY MEDICINE

## 2019-02-14 PROCEDURE — 3078F PR MOST RECENT DIASTOLIC BLOOD PRESSURE < 80 MM HG: ICD-10-PCS | Mod: CPTII,S$GLB,, | Performed by: FAMILY MEDICINE

## 2019-02-14 PROCEDURE — 3074F SYST BP LT 130 MM HG: CPT | Mod: CPTII,S$GLB,, | Performed by: FAMILY MEDICINE

## 2019-02-14 PROCEDURE — 3008F BODY MASS INDEX DOCD: CPT | Mod: CPTII,S$GLB,, | Performed by: FAMILY MEDICINE

## 2019-02-14 PROCEDURE — 3078F DIAST BP <80 MM HG: CPT | Mod: CPTII,S$GLB,, | Performed by: FAMILY MEDICINE

## 2019-02-14 PROCEDURE — 3074F PR MOST RECENT SYSTOLIC BLOOD PRESSURE < 130 MM HG: ICD-10-PCS | Mod: CPTII,S$GLB,, | Performed by: FAMILY MEDICINE

## 2019-02-14 PROCEDURE — 99213 OFFICE O/P EST LOW 20 MIN: CPT | Mod: S$GLB,,, | Performed by: FAMILY MEDICINE

## 2019-02-14 PROCEDURE — 99213 PR OFFICE/OUTPT VISIT, EST, LEVL III, 20-29 MIN: ICD-10-PCS | Mod: S$GLB,,, | Performed by: FAMILY MEDICINE

## 2019-02-14 PROCEDURE — 3008F PR BODY MASS INDEX (BMI) DOCUMENTED: ICD-10-PCS | Mod: CPTII,S$GLB,, | Performed by: FAMILY MEDICINE

## 2019-02-14 RX ORDER — BACLOFEN 10 MG/1
10 TABLET ORAL 2 TIMES DAILY
Qty: 42 TABLET | Refills: 0 | Status: SHIPPED | OUTPATIENT
Start: 2019-02-14 | End: 2019-03-05 | Stop reason: SDUPTHER

## 2019-02-14 RX ORDER — ESTRADIOL 2 MG/1
TABLET ORAL
Refills: 12 | COMMUNITY
Start: 2019-02-10

## 2019-02-14 RX ORDER — MULTIVIT WITH MINERALS/HERBS
1 TABLET ORAL DAILY
COMMUNITY

## 2019-02-14 RX ORDER — CETIRIZINE HYDROCHLORIDE 10 MG/1
10 TABLET, CHEWABLE ORAL DAILY
COMMUNITY
End: 2023-07-14

## 2019-02-14 RX ORDER — OLMESARTAN MEDOXOMIL 20 MG/1
20 TABLET ORAL DAILY
Refills: 6 | COMMUNITY
Start: 2019-02-03

## 2019-02-14 NOTE — PATIENT INSTRUCTIONS
Back Exercise to Keep Fit for Low Back Pain  To help in your recovery and to prevent further back problems, keep your back, abdominal muscles and legs strong. Walk daily as soon as you can. Gradually add other physical activities such as swimming and biking, which can help improve lower back strength. Begin as soon as you can do them comfortably. Do not do any exercises that make your pain a lot worse. The following are some back exercises that can help relieve low back pain.     Pelvic tilt   Repeat 5-10 times, twice per day.  Lie flat on your back (or stand with your back to a wall), knees bent, feet flat on the floor, body relaxed. Tighten your abdominal and buttock muscles, and tilt your pelvis. The curve of the small of your back should flatten towards the floor (or wall). Hold 10 seconds and then relax.       Knee raise     Repeat 5-10 times, twice per day.    Lie flat on your back, knees bent. Bring one knee slowly to your chest. Hug your knee gently. Then lower your leg toward the floor, keeping your knee bent. Do not straighten your legs. Repeat exercise with your other leg.              Partial press-up     Lie face down on a soft, firm surface. Do not turn your head to either side. Rest your arms bent at the elbows alongside your body. Relax for a few minutes. Then raise your upper body enough to lean on your elbows. Relax your lower back and legs as much as possible. Hold this position for 30 seconds at first. Gradually work up to five minutes. Or try slow press-ups. Hold each for five seconds, and repeat five to six times.              Copyright © 2012 by [employer]   Danielito KEY, Cecilia LITTLE, Annita BOLANOS, Rosie PRINCE, Puneet R, Roshan B, Bryant K, Paulo C, Arturo B, New S, Shasta L, Jonah R. [employer]. Adult Acute and Subacute Low Back Pain. Updated November 2012.       Baclofen tablets  What is this medicine?  BACLOFEN (NAHOMY dileep fen)  helps relieve spasms and cramping of muscles. It may be used to treat symptoms of multiple sclerosis or spinal cord injury.  How should I use this medicine?  Take this medicine by mouth. Swallow it with a drink of water. Follow the directions on the prescription label. Do not take more medicine than you are told to take.  Talk to your pediatrician regarding the use of this medicine in children. Special care may be needed.  What side effects may I notice from receiving this medicine?  Side effects that you should report to your doctor or health care professional as soon as possible:  · allergic reactions like skin rash, itching or hives, swelling of the face, lips, or tongue  · breathing problems  · changes in emotions or moods  · changes in vision  · chest pain  · fast, irregular heartbeat  · feeling faint or lightheaded, falls  · hallucinations  · loss of balance or coordination  · ringing of the ears  · seizures  · trouble passing urine or change in the amount of urine  · trouble walking  · unusually weak or tired  Side effects that usually do not require medical attention (report to your doctor or health care professional if they continue or are bothersome):  · changes in taste  · confusion  · constipation  · diarrhea  · dry mouth  · headache  · muscle weakness  · nausea, vomiting  · trouble sleeping  What may interact with this medicine?  Do not take this medication with any of the following medicines:  · narcotic medicines for cough  This medicine may also interact with the following medications:  · alcohol  · antihistamines for allergy, cough and cold  · certain medicines for anxiety or sleep  · certain medicines for depression like amitriptyline, fluoxetine, sertraline  · certain medicines for seizures like phenobarbital, primidone  · general anesthetics like halothane, isoflurane, methoxyflurane, propofol  · local anesthetics like lidocaine, pramoxine, tetracaine  · medicines that relax muscles for  surgery  · narcotic medicines for pain  · phenothiazines like chlorpromazine, mesoridazine, prochlorperazine, thioridazine  What if I miss a dose?  If you miss a dose, take it as soon as you can. If it is almost time for your next dose, take only that dose. Do not take double or extra doses.  Where should I keep my medicine?  Keep out of the reach of children.  Store at room temperature between 15 and 30 degrees C (59 and 86 degrees F). Keep container tightly closed. Throw away any unused medicine after the expiration date.  What should I tell my health care provider before I take this medicine?  They need to know if you have any of these conditions:  · kidney disease  · seizures  · stroke  · an unusual or allergic reaction to baclofen, other medicines, foods, dyes, or preservatives  · pregnant or trying to get pregnant  · breast-feeding  What should I watch for while using this medicine?  Tell your doctor or health care professional if your symptoms do not start to get better or if they get worse.  Do not suddenly stop taking your medicine. If you do, you may develop a severe reaction. If your doctor wants you to stop the medicine, the dose will be slowly lowered over time to avoid any side effects. Follow the advice of your doctor.  You may get drowsy or dizzy. Do not drive, use machinery, or do anything that needs mental alertness until you know how this medicine affects you. Do not stand or sit up quickly, especially if you are an older patient. This reduces the risk of dizzy or fainting spells. Alcohol may interfere with the effect of this medicine. Avoid alcoholic drinks.  If you are taking another medicine that also causes drowsiness, you may have more side effects. Give your health care provider a list of all medicines you use. Your doctor will tell you how much medicine to take. Do not take more medicine than directed. Call emergency for help if you have problems breathing or unusual sleepiness.  NOTE:This  sheet is a summary. It may not cover all possible information. If you have questions about this medicine, talk to your doctor, pharmacist, or health care provider. Copyright© 2017 [employer]

## 2019-02-14 NOTE — PROGRESS NOTES
"Subjective:       Patient ID: Adelina Espitia is a 58 y.o. female.    Chief Complaint: Back Pain (3 months)    HPI  Has been seen for back pain in past, for left side  Now on right side  Has been to PT and pain management  Has not received ERIKA in past  Has tried baclofen, T4, tylenol, medrol dose pack in past  MRI performed in 2017 with DDD    Onset 3 months ago  Woke up with right sided back pain  Not present today  Occurred again on Tuesday after bending down  Sharp in character  No trauma to back  Uses tylenol with relief   Has not tried anything else  Denies red flag sx  Denies fever    Also w/ concerns regarding yearly sore throat and loss of voice  Onset 5 years ago  usu takes allergy meds for one week and sx resolves  Starts w/ nasal congestion, then sore throat, then loss of voice for 2 days  Denies fmh thyroid d/o or head/neck cancers  Denies unintentional weight loss  Review of Systems   Constitutional: Negative for fever and unexpected weight change.   HENT: Positive for voice change (yearly).    Genitourinary: Positive for frequency. Negative for decreased urine volume.   Musculoskeletal: Positive for back pain.   Skin: Negative for color change, rash and wound.        Objective:   BP (!) 122/59 (BP Location: Left arm, Patient Position: Sitting, BP Method: Medium (Automatic))   Pulse 69   Temp 99.8 °F (37.7 °C) (Tympanic)   Ht 5' 6" (1.676 m)   Wt 67.4 kg (148 lb 7.7 oz)   LMP  (LMP Unknown)   SpO2 99%   BMI 23.97 kg/m²     Physical Exam   Constitutional: She is oriented to person, place, and time. She appears well-nourished. No distress.   HENT:   Head: Normocephalic and atraumatic.   Eyes: Conjunctivae and EOM are normal. No scleral icterus.   Neck: Normal range of motion. Neck supple.   Cardiovascular: Normal rate.   Pulmonary/Chest: Effort normal. No respiratory distress.   Abdominal: Soft. There is no tenderness.   Musculoskeletal: Normal range of motion. She exhibits no edema or " deformity.   Normal forward flexion at waist  Mild pain illicited w/ right lateral bending at waist  No pain w/ left lateral bending at waist  Mild pain w backward extension at waist  No TTP over pelvic crest on right   Neurological: She is alert and oriented to person, place, and time.   Skin: Skin is warm and dry.   Psychiatric: She has a normal mood and affect. Her behavior is normal.   Vitals reviewed.    Assessment:     1. Acute right-sided low back pain without sciatica    2. Seasonal allergies      Plan:     Problem List Items Addressed This Visit        Other    Seasonal allergies    Current Assessment & Plan     Oct-nov sore throat and sinus pressure and voice change, unable to talk for 2 days. Onset 5 years ago. Takes allergy meds and resolves. Likely dysphonia related to allergies. If character/duration changes, consider ent referral           Other Visit Diagnoses     Acute right-sided low back pain without sciatica    -  Primary    Relevant Medications    baclofen (LIORESAL) 10 MG tablet      Muscle strain versus tendon strain.  Alternating NSAID plus Tylenol for analgesia.  Cold compress for 15 minutes followed by moist heat for 15 minutes. No x-ray indicated at this time.  Exercises provided.  If no improvement, consider physical therapy and/or injection therapy. Discussed treatment plan with joint decision making.  Patient verbalized understanding.      Follow-up if symptoms worsen or fail to improve.

## 2019-02-14 NOTE — ASSESSMENT & PLAN NOTE
Oct-nov sore throat and sinus pressure and voice change, unable to talk for 2 days. Onset 5 years ago. Takes allergy meds and resolves. Likely dysphonia related to allergies. If character/duration changes, consider ent referral

## 2019-03-05 DIAGNOSIS — M54.50 ACUTE RIGHT-SIDED LOW BACK PAIN WITHOUT SCIATICA: ICD-10-CM

## 2019-03-05 RX ORDER — BACLOFEN 10 MG/1
10 TABLET ORAL 2 TIMES DAILY
Qty: 42 TABLET | Refills: 0 | Status: SHIPPED | OUTPATIENT
Start: 2019-03-05 | End: 2022-05-12

## 2019-11-20 ENCOUNTER — OFFICE VISIT (OUTPATIENT)
Dept: INTERNAL MEDICINE | Facility: CLINIC | Age: 59
End: 2019-11-20
Payer: COMMERCIAL

## 2019-11-20 VITALS
HEART RATE: 67 BPM | HEIGHT: 66 IN | SYSTOLIC BLOOD PRESSURE: 142 MMHG | DIASTOLIC BLOOD PRESSURE: 80 MMHG | WEIGHT: 140.13 LBS | TEMPERATURE: 98 F | BODY MASS INDEX: 22.52 KG/M2 | OXYGEN SATURATION: 100 %

## 2019-11-20 DIAGNOSIS — M77.01 EPICONDYLITIS ELBOW, MEDIAL, RIGHT: Primary | ICD-10-CM

## 2019-11-20 PROCEDURE — 3008F PR BODY MASS INDEX (BMI) DOCUMENTED: ICD-10-PCS | Mod: CPTII,S$GLB,, | Performed by: FAMILY MEDICINE

## 2019-11-20 PROCEDURE — 99999 PR PBB SHADOW E&M-EST. PATIENT-LVL III: CPT | Mod: PBBFAC,,, | Performed by: FAMILY MEDICINE

## 2019-11-20 PROCEDURE — 99213 OFFICE O/P EST LOW 20 MIN: CPT | Mod: S$GLB,,, | Performed by: FAMILY MEDICINE

## 2019-11-20 PROCEDURE — 3079F DIAST BP 80-89 MM HG: CPT | Mod: CPTII,S$GLB,, | Performed by: FAMILY MEDICINE

## 2019-11-20 PROCEDURE — 3077F PR MOST RECENT SYSTOLIC BLOOD PRESSURE >= 140 MM HG: ICD-10-PCS | Mod: CPTII,S$GLB,, | Performed by: FAMILY MEDICINE

## 2019-11-20 PROCEDURE — 3079F PR MOST RECENT DIASTOLIC BLOOD PRESSURE 80-89 MM HG: ICD-10-PCS | Mod: CPTII,S$GLB,, | Performed by: FAMILY MEDICINE

## 2019-11-20 PROCEDURE — 99999 PR PBB SHADOW E&M-EST. PATIENT-LVL III: ICD-10-PCS | Mod: PBBFAC,,, | Performed by: FAMILY MEDICINE

## 2019-11-20 PROCEDURE — 3077F SYST BP >= 140 MM HG: CPT | Mod: CPTII,S$GLB,, | Performed by: FAMILY MEDICINE

## 2019-11-20 PROCEDURE — 3008F BODY MASS INDEX DOCD: CPT | Mod: CPTII,S$GLB,, | Performed by: FAMILY MEDICINE

## 2019-11-20 PROCEDURE — 99213 PR OFFICE/OUTPT VISIT, EST, LEVL III, 20-29 MIN: ICD-10-PCS | Mod: S$GLB,,, | Performed by: FAMILY MEDICINE

## 2019-11-20 RX ORDER — METHYLPREDNISOLONE 4 MG/1
TABLET ORAL
Qty: 1 PACKAGE | Refills: 0 | Status: SHIPPED | OUTPATIENT
Start: 2019-11-20 | End: 2019-12-11

## 2019-11-20 NOTE — PROGRESS NOTES
Subjective:       Patient ID: Adelina Espitia is a 59 y.o. female.    Chief Complaint: Elbow Pain (right)    Several days duration of pain of the right elbow.  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  She denies any recent unusual activity.  She denies any trauma puncture wounds or abrasions.  She denies fever chills.  She has no history of gout.  She is followed by Cardiology for hypertension.    Review of Systems   Constitutional: Negative for chills and fever.   Musculoskeletal: Positive for arthralgias.        Right elbow pain   Neurological: Negative for weakness and numbness.       Objective:      Physical Exam   Constitutional: She appears well-developed and well-nourished. No distress.   Musculoskeletal:   Right epicondylar area is very tender with some erythema and minimal swelling. She lacks full extension of the elbow.  She has no pain with rotation of the elbow.  She has no tenderness of the elbow in areas of the epicondylar region.  Skin is intact with no abrasion or puncture wound.       Clinical Support on 11/16/2018   Component Date Value Ref Range Status    Cholesterol 11/16/2018 194  120 - 199 mg/dL Final    Triglycerides 11/16/2018 44  30 - 150 mg/dL Final    HDL 11/16/2018 87* 40 - 75 mg/dL Final    LDL Cholesterol 11/16/2018 98.2  63.0 - 159.0 mg/dL Final    Hdl/Cholesterol Ratio 11/16/2018 44.8  20.0 - 50.0 % Final    Total Cholesterol/HDL Ratio 11/16/2018 2.2  2.0 - 5.0 Final    Non-HDL Cholesterol 11/16/2018 107  mg/dL Final    Sodium 11/16/2018 141  136 - 145 mmol/L Final    Potassium 11/16/2018 4.1  3.5 - 5.1 mmol/L Final    Chloride 11/16/2018 105  95 - 110 mmol/L Final    CO2 11/16/2018 29  23 - 29 mmol/L Final    Glucose 11/16/2018 86  70 - 110 mg/dL Final    BUN, Bld 11/16/2018 20  6 - 20 mg/dL Final    Creatinine 11/16/2018 0.8  0.5 - 1.4 mg/dL Final    Calcium 11/16/2018 9.5  8.7 - 10.5 mg/dL Final    Anion Gap 11/16/2018 7* 8 - 16 mmol/L Final     eGFR if African American 11/16/2018 >60.0  >60 mL/min/1.73 m^2 Final    eGFR if non African American 11/16/2018 >60.0  >60 mL/min/1.73 m^2 Final    WBC 11/16/2018 2.51* 3.90 - 12.70 K/uL Final    RBC 11/16/2018 4.21  4.00 - 5.40 M/uL Final    Hemoglobin 11/16/2018 13.2  12.0 - 16.0 g/dL Final    Hematocrit 11/16/2018 40.7  37.0 - 48.5 % Final    Mean Corpuscular Volume 11/16/2018 97  82 - 98 fL Final    Mean Corpuscular Hemoglobin 11/16/2018 31.4* 27.0 - 31.0 pg Final    Mean Corpuscular Hemoglobin Conc 11/16/2018 32.4  32.0 - 36.0 g/dL Final    RDW 11/16/2018 13.4  11.5 - 14.5 % Final    Platelets 11/16/2018 228  150 - 350 K/uL Final    MPV 11/16/2018 10.8  9.2 - 12.9 fL Final    Immature Granulocytes 11/16/2018 0.0  0.0 - 0.5 % Final    Gran # (ANC) 11/16/2018 1.1* 1.8 - 7.7 K/uL Final    Immature Grans (Abs) 11/16/2018 0.00  0.00 - 0.04 K/uL Final    Lymph # 11/16/2018 1.1  1.0 - 4.8 K/uL Final    Mono # 11/16/2018 0.2* 0.3 - 1.0 K/uL Final    Eos # 11/16/2018 0.0  0.0 - 0.5 K/uL Final    Baso # 11/16/2018 0.02  0.00 - 0.20 K/uL Final    nRBC 11/16/2018 0  0 /100 WBC Final    Gran% 11/16/2018 44.6  38.0 - 73.0 % Final    Lymph% 11/16/2018 45.4  18.0 - 48.0 % Final    Mono% 11/16/2018 8.4  4.0 - 15.0 % Final    Eosinophil% 11/16/2018 0.8  0.0 - 8.0 % Final    Basophil% 11/16/2018 0.8  0.0 - 1.9 % Final    Differential Method 11/16/2018 Automated   Final     Assessment:       No diagnosis found.    Plan:    Acute epicondylitis.  Doubt cellulitis doubt acute arthritis.  Medrol Dosepak ice forearm strap try to avoid straining right forearm.  Follow-up in 1 week call if fever chills develop      There are no diagnoses linked to this encounter.

## 2019-11-27 ENCOUNTER — OFFICE VISIT (OUTPATIENT)
Dept: INTERNAL MEDICINE | Facility: CLINIC | Age: 59
End: 2019-11-27
Payer: COMMERCIAL

## 2019-11-27 VITALS
HEART RATE: 61 BPM | HEIGHT: 66 IN | BODY MASS INDEX: 21.72 KG/M2 | TEMPERATURE: 97 F | WEIGHT: 135.13 LBS | SYSTOLIC BLOOD PRESSURE: 122 MMHG | DIASTOLIC BLOOD PRESSURE: 68 MMHG | OXYGEN SATURATION: 99 %

## 2019-11-27 DIAGNOSIS — M77.01 EPICONDYLITIS ELBOW, MEDIAL, RIGHT: Primary | ICD-10-CM

## 2019-11-27 DIAGNOSIS — Z28.39 IMMUNIZATION DEFICIENCY: ICD-10-CM

## 2019-11-27 PROCEDURE — 3078F PR MOST RECENT DIASTOLIC BLOOD PRESSURE < 80 MM HG: ICD-10-PCS | Mod: CPTII,S$GLB,, | Performed by: FAMILY MEDICINE

## 2019-11-27 PROCEDURE — 3008F BODY MASS INDEX DOCD: CPT | Mod: CPTII,S$GLB,, | Performed by: FAMILY MEDICINE

## 2019-11-27 PROCEDURE — 99213 PR OFFICE/OUTPT VISIT, EST, LEVL III, 20-29 MIN: ICD-10-PCS | Mod: S$GLB,,, | Performed by: FAMILY MEDICINE

## 2019-11-27 PROCEDURE — 99999 PR PBB SHADOW E&M-EST. PATIENT-LVL III: CPT | Mod: PBBFAC,,, | Performed by: FAMILY MEDICINE

## 2019-11-27 PROCEDURE — 3008F PR BODY MASS INDEX (BMI) DOCUMENTED: ICD-10-PCS | Mod: CPTII,S$GLB,, | Performed by: FAMILY MEDICINE

## 2019-11-27 PROCEDURE — 99999 PR PBB SHADOW E&M-EST. PATIENT-LVL III: ICD-10-PCS | Mod: PBBFAC,,, | Performed by: FAMILY MEDICINE

## 2019-11-27 PROCEDURE — 3074F PR MOST RECENT SYSTOLIC BLOOD PRESSURE < 130 MM HG: ICD-10-PCS | Mod: CPTII,S$GLB,, | Performed by: FAMILY MEDICINE

## 2019-11-27 PROCEDURE — 99213 OFFICE O/P EST LOW 20 MIN: CPT | Mod: S$GLB,,, | Performed by: FAMILY MEDICINE

## 2019-11-27 PROCEDURE — 3078F DIAST BP <80 MM HG: CPT | Mod: CPTII,S$GLB,, | Performed by: FAMILY MEDICINE

## 2019-11-27 PROCEDURE — 3074F SYST BP LT 130 MM HG: CPT | Mod: CPTII,S$GLB,, | Performed by: FAMILY MEDICINE

## 2019-11-27 NOTE — PROGRESS NOTES
Subjective:       Patient ID: Adelina Espitia is a 59 y.o. female.    Chief Complaint: Follow-up    Follow-up right epicondylitis.  She completed Medrol Dosepak and using forearm strap.  He reports no further pain or swelling of the right elbow.    Review of Systems   Constitutional: Negative for chills and fever.   Musculoskeletal:        Resolution of right elbow pain       Objective:      Physical Exam   Constitutional: She appears well-developed and well-nourished. No distress.   Musculoskeletal:   Right epicondylar area no tenderness swelling or redness.  Full range of motion with no discomfort       Clinical Support on 11/16/2018   Component Date Value Ref Range Status    Cholesterol 11/16/2018 194  120 - 199 mg/dL Final    Triglycerides 11/16/2018 44  30 - 150 mg/dL Final    HDL 11/16/2018 87* 40 - 75 mg/dL Final    LDL Cholesterol 11/16/2018 98.2  63.0 - 159.0 mg/dL Final    Hdl/Cholesterol Ratio 11/16/2018 44.8  20.0 - 50.0 % Final    Total Cholesterol/HDL Ratio 11/16/2018 2.2  2.0 - 5.0 Final    Non-HDL Cholesterol 11/16/2018 107  mg/dL Final    Sodium 11/16/2018 141  136 - 145 mmol/L Final    Potassium 11/16/2018 4.1  3.5 - 5.1 mmol/L Final    Chloride 11/16/2018 105  95 - 110 mmol/L Final    CO2 11/16/2018 29  23 - 29 mmol/L Final    Glucose 11/16/2018 86  70 - 110 mg/dL Final    BUN, Bld 11/16/2018 20  6 - 20 mg/dL Final    Creatinine 11/16/2018 0.8  0.5 - 1.4 mg/dL Final    Calcium 11/16/2018 9.5  8.7 - 10.5 mg/dL Final    Anion Gap 11/16/2018 7* 8 - 16 mmol/L Final    eGFR if African American 11/16/2018 >60.0  >60 mL/min/1.73 m^2 Final    eGFR if non African American 11/16/2018 >60.0  >60 mL/min/1.73 m^2 Final    WBC 11/16/2018 2.51* 3.90 - 12.70 K/uL Final    RBC 11/16/2018 4.21  4.00 - 5.40 M/uL Final    Hemoglobin 11/16/2018 13.2  12.0 - 16.0 g/dL Final    Hematocrit 11/16/2018 40.7  37.0 - 48.5 % Final    Mean Corpuscular Volume 11/16/2018 97  82 - 98 fL Final     Mean Corpuscular Hemoglobin 11/16/2018 31.4* 27.0 - 31.0 pg Final    Mean Corpuscular Hemoglobin Conc 11/16/2018 32.4  32.0 - 36.0 g/dL Final    RDW 11/16/2018 13.4  11.5 - 14.5 % Final    Platelets 11/16/2018 228  150 - 350 K/uL Final    MPV 11/16/2018 10.8  9.2 - 12.9 fL Final    Immature Granulocytes 11/16/2018 0.0  0.0 - 0.5 % Final    Gran # (ANC) 11/16/2018 1.1* 1.8 - 7.7 K/uL Final    Immature Grans (Abs) 11/16/2018 0.00  0.00 - 0.04 K/uL Final    Lymph # 11/16/2018 1.1  1.0 - 4.8 K/uL Final    Mono # 11/16/2018 0.2* 0.3 - 1.0 K/uL Final    Eos # 11/16/2018 0.0  0.0 - 0.5 K/uL Final    Baso # 11/16/2018 0.02  0.00 - 0.20 K/uL Final    nRBC 11/16/2018 0  0 /100 WBC Final    Gran% 11/16/2018 44.6  38.0 - 73.0 % Final    Lymph% 11/16/2018 45.4  18.0 - 48.0 % Final    Mono% 11/16/2018 8.4  4.0 - 15.0 % Final    Eosinophil% 11/16/2018 0.8  0.0 - 8.0 % Final    Basophil% 11/16/2018 0.8  0.0 - 1.9 % Final    Differential Method 11/16/2018 Automated   Final     Assessment:       No diagnosis found.    Plan:     Epicondylitis resolved.  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  Discussed need for shingles immunization at the pharmacy.  Follow-up when needed  There are no diagnoses linked to this encounter.

## 2021-02-19 ENCOUNTER — PATIENT OUTREACH (OUTPATIENT)
Dept: ADMINISTRATIVE | Facility: HOSPITAL | Age: 61
End: 2021-02-19

## 2022-05-12 ENCOUNTER — OFFICE VISIT (OUTPATIENT)
Dept: INTERNAL MEDICINE | Facility: CLINIC | Age: 62
End: 2022-05-12
Payer: COMMERCIAL

## 2022-05-12 ENCOUNTER — LAB VISIT (OUTPATIENT)
Dept: LAB | Facility: HOSPITAL | Age: 62
End: 2022-05-12
Attending: FAMILY MEDICINE
Payer: COMMERCIAL

## 2022-05-12 VITALS
TEMPERATURE: 98 F | RESPIRATION RATE: 18 BRPM | OXYGEN SATURATION: 99 % | DIASTOLIC BLOOD PRESSURE: 84 MMHG | WEIGHT: 134.94 LBS | HEART RATE: 58 BPM | BODY MASS INDEX: 21.69 KG/M2 | SYSTOLIC BLOOD PRESSURE: 130 MMHG | HEIGHT: 66 IN

## 2022-05-12 DIAGNOSIS — L23.9 ALLERGIC CONTACT DERMATITIS, UNSPECIFIED TRIGGER: ICD-10-CM

## 2022-05-12 DIAGNOSIS — I10 PRIMARY HYPERTENSION: ICD-10-CM

## 2022-05-12 DIAGNOSIS — Z00.00 ANNUAL PHYSICAL EXAM: Primary | ICD-10-CM

## 2022-05-12 DIAGNOSIS — J30.2 SEASONAL ALLERGIES: ICD-10-CM

## 2022-05-12 DIAGNOSIS — G89.29 CHRONIC RIGHT SHOULDER PAIN: ICD-10-CM

## 2022-05-12 DIAGNOSIS — M25.511 CHRONIC RIGHT SHOULDER PAIN: ICD-10-CM

## 2022-05-12 DIAGNOSIS — H40.9 GLAUCOMA OF BOTH EYES, UNSPECIFIED GLAUCOMA TYPE: ICD-10-CM

## 2022-05-12 PROBLEM — M77.01 EPICONDYLITIS ELBOW, MEDIAL, RIGHT: Status: RESOLVED | Noted: 2019-11-20 | Resolved: 2022-05-12

## 2022-05-12 LAB
ALBUMIN SERPL BCP-MCNC: 4.5 G/DL (ref 3.5–5.2)
ALP SERPL-CCNC: 47 U/L (ref 55–135)
ALT SERPL W/O P-5'-P-CCNC: 22 U/L (ref 10–44)
ANION GAP SERPL CALC-SCNC: 10 MMOL/L (ref 8–16)
AST SERPL-CCNC: 23 U/L (ref 10–40)
BASOPHILS # BLD AUTO: 0.01 K/UL (ref 0–0.2)
BASOPHILS NFR BLD: 0.4 % (ref 0–1.9)
BILIRUB SERPL-MCNC: 0.9 MG/DL (ref 0.1–1)
BUN SERPL-MCNC: 15 MG/DL (ref 8–23)
CALCIUM SERPL-MCNC: 9.8 MG/DL (ref 8.7–10.5)
CHLORIDE SERPL-SCNC: 106 MMOL/L (ref 95–110)
CHOLEST SERPL-MCNC: 216 MG/DL (ref 120–199)
CHOLEST/HDLC SERPL: 2.1 {RATIO} (ref 2–5)
CO2 SERPL-SCNC: 26 MMOL/L (ref 23–29)
CREAT SERPL-MCNC: 0.9 MG/DL (ref 0.5–1.4)
DIFFERENTIAL METHOD: ABNORMAL
EOSINOPHIL # BLD AUTO: 0 K/UL (ref 0–0.5)
EOSINOPHIL NFR BLD: 1.2 % (ref 0–8)
ERYTHROCYTE [DISTWIDTH] IN BLOOD BY AUTOMATED COUNT: 13.1 % (ref 11.5–14.5)
EST. GFR  (AFRICAN AMERICAN): >60 ML/MIN/1.73 M^2
EST. GFR  (NON AFRICAN AMERICAN): >60 ML/MIN/1.73 M^2
GLUCOSE SERPL-MCNC: 90 MG/DL (ref 70–110)
HCT VFR BLD AUTO: 45.6 % (ref 37–48.5)
HDLC SERPL-MCNC: 102 MG/DL (ref 40–75)
HDLC SERPL: 47.2 % (ref 20–50)
HGB BLD-MCNC: 14.4 G/DL (ref 12–16)
IMM GRANULOCYTES # BLD AUTO: 0 K/UL (ref 0–0.04)
IMM GRANULOCYTES NFR BLD AUTO: 0 % (ref 0–0.5)
LDLC SERPL CALC-MCNC: 101.8 MG/DL (ref 63–159)
LYMPHOCYTES # BLD AUTO: 1 K/UL (ref 1–4.8)
LYMPHOCYTES NFR BLD: 39.8 % (ref 18–48)
MCH RBC QN AUTO: 30.8 PG (ref 27–31)
MCHC RBC AUTO-ENTMCNC: 31.6 G/DL (ref 32–36)
MCV RBC AUTO: 97 FL (ref 82–98)
MONOCYTES # BLD AUTO: 0.2 K/UL (ref 0.3–1)
MONOCYTES NFR BLD: 9.4 % (ref 4–15)
NEUTROPHILS # BLD AUTO: 1.2 K/UL (ref 1.8–7.7)
NEUTROPHILS NFR BLD: 49.2 % (ref 38–73)
NONHDLC SERPL-MCNC: 114 MG/DL
NRBC BLD-RTO: 0 /100 WBC
PLATELET # BLD AUTO: 233 K/UL (ref 150–450)
PMV BLD AUTO: 10.9 FL (ref 9.2–12.9)
POTASSIUM SERPL-SCNC: 4 MMOL/L (ref 3.5–5.1)
PROT SERPL-MCNC: 7.8 G/DL (ref 6–8.4)
RBC # BLD AUTO: 4.68 M/UL (ref 4–5.4)
SODIUM SERPL-SCNC: 142 MMOL/L (ref 136–145)
TRIGL SERPL-MCNC: 61 MG/DL (ref 30–150)
TSH SERPL DL<=0.005 MIU/L-ACNC: 2.51 UIU/ML (ref 0.4–4)
WBC # BLD AUTO: 2.44 K/UL (ref 3.9–12.7)

## 2022-05-12 PROCEDURE — 3075F SYST BP GE 130 - 139MM HG: CPT | Mod: CPTII,S$GLB,, | Performed by: FAMILY MEDICINE

## 2022-05-12 PROCEDURE — 80061 LIPID PANEL: CPT | Performed by: FAMILY MEDICINE

## 2022-05-12 PROCEDURE — 3075F PR MOST RECENT SYSTOLIC BLOOD PRESS GE 130-139MM HG: ICD-10-PCS | Mod: CPTII,S$GLB,, | Performed by: FAMILY MEDICINE

## 2022-05-12 PROCEDURE — 3008F PR BODY MASS INDEX (BMI) DOCUMENTED: ICD-10-PCS | Mod: CPTII,S$GLB,, | Performed by: FAMILY MEDICINE

## 2022-05-12 PROCEDURE — 4010F ACE/ARB THERAPY RXD/TAKEN: CPT | Mod: CPTII,S$GLB,, | Performed by: FAMILY MEDICINE

## 2022-05-12 PROCEDURE — 3008F BODY MASS INDEX DOCD: CPT | Mod: CPTII,S$GLB,, | Performed by: FAMILY MEDICINE

## 2022-05-12 PROCEDURE — 4010F PR ACE/ARB THEARPY RXD/TAKEN: ICD-10-PCS | Mod: CPTII,S$GLB,, | Performed by: FAMILY MEDICINE

## 2022-05-12 PROCEDURE — 84443 ASSAY THYROID STIM HORMONE: CPT | Performed by: FAMILY MEDICINE

## 2022-05-12 PROCEDURE — 99214 OFFICE O/P EST MOD 30 MIN: CPT | Mod: S$GLB,,, | Performed by: FAMILY MEDICINE

## 2022-05-12 PROCEDURE — 99999 PR PBB SHADOW E&M-EST. PATIENT-LVL IV: CPT | Mod: PBBFAC,,, | Performed by: FAMILY MEDICINE

## 2022-05-12 PROCEDURE — 36415 COLL VENOUS BLD VENIPUNCTURE: CPT | Performed by: FAMILY MEDICINE

## 2022-05-12 PROCEDURE — 1159F PR MEDICATION LIST DOCUMENTED IN MEDICAL RECORD: ICD-10-PCS | Mod: CPTII,S$GLB,, | Performed by: FAMILY MEDICINE

## 2022-05-12 PROCEDURE — 80053 COMPREHEN METABOLIC PANEL: CPT | Performed by: FAMILY MEDICINE

## 2022-05-12 PROCEDURE — 3079F PR MOST RECENT DIASTOLIC BLOOD PRESSURE 80-89 MM HG: ICD-10-PCS | Mod: CPTII,S$GLB,, | Performed by: FAMILY MEDICINE

## 2022-05-12 PROCEDURE — 99999 PR PBB SHADOW E&M-EST. PATIENT-LVL IV: ICD-10-PCS | Mod: PBBFAC,,, | Performed by: FAMILY MEDICINE

## 2022-05-12 PROCEDURE — 1159F MED LIST DOCD IN RCRD: CPT | Mod: CPTII,S$GLB,, | Performed by: FAMILY MEDICINE

## 2022-05-12 PROCEDURE — 3079F DIAST BP 80-89 MM HG: CPT | Mod: CPTII,S$GLB,, | Performed by: FAMILY MEDICINE

## 2022-05-12 PROCEDURE — 85025 COMPLETE CBC W/AUTO DIFF WBC: CPT | Performed by: FAMILY MEDICINE

## 2022-05-12 PROCEDURE — 99214 PR OFFICE/OUTPT VISIT, EST, LEVL IV, 30-39 MIN: ICD-10-PCS | Mod: S$GLB,,, | Performed by: FAMILY MEDICINE

## 2022-05-12 RX ORDER — TRIAMCINOLONE ACETONIDE 1 MG/G
CREAM TOPICAL 2 TIMES DAILY
Qty: 30 G | Refills: 1 | Status: SHIPPED | OUTPATIENT
Start: 2022-05-12 | End: 2022-12-05

## 2022-05-12 NOTE — PROGRESS NOTES
Subjective:       Patient ID: Adelina Espitia is a 62 y.o. female.    Chief Complaint: Annual Exam    delio exam.  Follow-up hypertension esophageal reflux with stricture seasonal allergic rhinitis right shoulder pain itchy rash on her back.  She denies headache chest pain palpitations shortness of breath or edema.  She reports no further esophageal reflux.  No dysphagia.  She is not on current medications for this.  She occasionally takes antihistamine for allergic rhinitis.  She has had over years duration of occasional right shoulder discomfort with no trauma.  She has had about 6 months duration of itchy rash on her back.  His in the area of the metal portion of her bra strap.    Review of Systems   Constitutional: Negative for appetite change, fatigue and unexpected weight change.   HENT: Positive for congestion and sneezing.    Eyes:        Glaucoma she reports is under control   Respiratory: Negative for cough, chest tightness, shortness of breath and wheezing.    Cardiovascular: Negative for chest pain, palpitations and leg swelling.   Gastrointestinal: Negative for abdominal distention and abdominal pain.        She is due for repeat colonoscopy that was done about 5 years ago.  She thinks she had a polyp.   Genitourinary: Negative for difficulty urinating, dysuria, frequency, hematuria and urgency.        Gyn exam mammogram up-to-date.  She is on estrogen replacement.   Musculoskeletal: Positive for arthralgias.        Right shoulder pain   Neurological: Negative for dizziness, weakness, light-headedness and headaches.       Objective:      Physical Exam  Constitutional:       General: She is not in acute distress.     Appearance: Normal appearance. She is well-developed. She is not ill-appearing or diaphoretic.   HENT:      Right Ear: Tympanic membrane normal.      Left Ear: Tympanic membrane normal.      Nose: Nose normal.   Eyes:      Conjunctiva/sclera: Conjunctivae normal.   Neck:      Thyroid:  No thyromegaly.      Comments: Normal thyroid  Cardiovascular:      Rate and Rhythm: Normal rate and regular rhythm.      Heart sounds: Normal heart sounds. No murmur heard.    No gallop.   Pulmonary:      Effort: Pulmonary effort is normal. No respiratory distress.      Breath sounds: Normal breath sounds. No wheezing or rhonchi.   Abdominal:      General: Bowel sounds are normal. There is no distension.      Palpations: Abdomen is soft. There is no mass.      Tenderness: There is no abdominal tenderness.   Musculoskeletal:         General: No swelling, tenderness or deformity.      Cervical back: Neck supple. No tenderness.      Comments: Normal exam both shoulders.   Lymphadenopathy:      Cervical: No cervical adenopathy.   Skin:     General: Skin is warm and dry.      Coloration: Skin is not pale.      Findings: No erythema.      Comments: Area of roughness to palpation but no rash in the back region in the area of her bra strap.  She also has keloid of left posterior shoulder   Neurological:      Mental Status: She is alert and oriented to person, place, and time.   Psychiatric:         Mood and Affect: Mood normal.         Behavior: Behavior normal.         Thought Content: Thought content normal.         Judgment: Judgment normal.         Clinical Support on 11/16/2018   Component Date Value Ref Range Status    Cholesterol 11/16/2018 194  120 - 199 mg/dL Final    Triglycerides 11/16/2018 44  30 - 150 mg/dL Final    HDL 11/16/2018 87 (A) 40 - 75 mg/dL Final    LDL Cholesterol 11/16/2018 98.2  63.0 - 159.0 mg/dL Final    HDL/Cholesterol Ratio 11/16/2018 44.8  20.0 - 50.0 % Final    Total Cholesterol/HDL Ratio 11/16/2018 2.2  2.0 - 5.0 Final    Non-HDL Cholesterol 11/16/2018 107  mg/dL Final    Sodium 11/16/2018 141  136 - 145 mmol/L Final    Potassium 11/16/2018 4.1  3.5 - 5.1 mmol/L Final    Chloride 11/16/2018 105  95 - 110 mmol/L Final    CO2 11/16/2018 29  23 - 29 mmol/L Final    Glucose  11/16/2018 86  70 - 110 mg/dL Final    BUN 11/16/2018 20  6 - 20 mg/dL Final    Creatinine 11/16/2018 0.8  0.5 - 1.4 mg/dL Final    Calcium 11/16/2018 9.5  8.7 - 10.5 mg/dL Final    Anion Gap 11/16/2018 7 (A) 8 - 16 mmol/L Final    eGFR if African American 11/16/2018 >60.0  >60 mL/min/1.73 m^2 Final    eGFR if non African American 11/16/2018 >60.0  >60 mL/min/1.73 m^2 Final    WBC 11/16/2018 2.51 (A) 3.90 - 12.70 K/uL Final    RBC 11/16/2018 4.21  4.00 - 5.40 M/uL Final    Hemoglobin 11/16/2018 13.2  12.0 - 16.0 g/dL Final    Hematocrit 11/16/2018 40.7  37.0 - 48.5 % Final    MCV 11/16/2018 97  82 - 98 fL Final    MCH 11/16/2018 31.4 (A) 27.0 - 31.0 pg Final    MCHC 11/16/2018 32.4  32.0 - 36.0 g/dL Final    RDW 11/16/2018 13.4  11.5 - 14.5 % Final    Platelets 11/16/2018 228  150 - 350 K/uL Final    MPV 11/16/2018 10.8  9.2 - 12.9 fL Final    Immature Granulocytes 11/16/2018 0.0  0.0 - 0.5 % Final    Gran # (ANC) 11/16/2018 1.1 (A) 1.8 - 7.7 K/uL Final    Immature Grans (Abs) 11/16/2018 0.00  0.00 - 0.04 K/uL Final    Lymph # 11/16/2018 1.1  1.0 - 4.8 K/uL Final    Mono # 11/16/2018 0.2 (A) 0.3 - 1.0 K/uL Final    Eos # 11/16/2018 0.0  0.0 - 0.5 K/uL Final    Baso # 11/16/2018 0.02  0.00 - 0.20 K/uL Final    nRBC 11/16/2018 0  0 /100 WBC Final    Gran % 11/16/2018 44.6  38.0 - 73.0 % Final    Lymph % 11/16/2018 45.4  18.0 - 48.0 % Final    Mono % 11/16/2018 8.4  4.0 - 15.0 % Final    Eosinophil % 11/16/2018 0.8  0.0 - 8.0 % Final    Basophil % 11/16/2018 0.8  0.0 - 1.9 % Final    Differential Method 11/16/2018 Automated   Final     Assessment:       1. Primary hypertension        Plan:     Blood pressure controlled.  Lab was ordered.  Discussed need to change trial regards possible metal allergy on her strep.  Triamcinolone cream twice a day for 2 weeks as needed.  He Tylenol range of motion for probable early degenerative arthritis of the right shoulder.  Follow-up in 1  year    Primary hypertension  -     CBC Auto Differential; Future; Expected date: 05/12/2022  -     Urinalysis; Future; Expected date: 05/12/2022  -     Comprehensive Metabolic Panel; Future; Expected date: 05/12/2022  -     Lipid Panel; Future; Expected date: 05/12/2022  -     TSH; Future; Expected date: 05/12/2022    Other orders  -     triamcinolone acetonide 0.1% (KENALOG) 0.1 % cream; Apply topically 2 (two) times daily.  Dispense: 30 g; Refill: 1

## 2022-05-16 ENCOUNTER — TELEPHONE (OUTPATIENT)
Dept: INTERNAL MEDICINE | Facility: CLINIC | Age: 62
End: 2022-05-16
Payer: COMMERCIAL

## 2022-05-16 DIAGNOSIS — R31.29 MICROSCOPIC HEMATURIA: Primary | ICD-10-CM

## 2022-05-16 NOTE — TELEPHONE ENCOUNTER
----- Message from Guy Roberson MD sent at 5/16/2022  7:48 AM CDT -----  Increase red blood cells in urine.  Will refer to Urology for evaluation.  Mild decreased white blood cell count is stable.  Rest lab is normal.

## 2022-05-16 NOTE — TELEPHONE ENCOUNTER
----- Message from aKmilah Stauffer sent at 5/16/2022  2:07 PM CDT -----  Type:  Patient Returning Call    Who Called:patient  Who Left Message for Patient:nurse  Does the patient know what this is regarding?:na  Would the patient rather a call back or a response via [facility]? Call back  Best Call Back Number:[phone]  Additional Information: na

## 2022-05-16 NOTE — TELEPHONE ENCOUNTER
Tried to reach pt to convey results of U/A, left message stating that I would send her a MyChart message and that she could call back with any questions.

## 2022-06-14 ENCOUNTER — OFFICE VISIT (OUTPATIENT)
Dept: UROLOGY | Facility: CLINIC | Age: 62
End: 2022-06-14
Payer: COMMERCIAL

## 2022-06-14 VITALS
BODY MASS INDEX: 21.9 KG/M2 | WEIGHT: 135.69 LBS | HEART RATE: 54 BPM | DIASTOLIC BLOOD PRESSURE: 59 MMHG | SYSTOLIC BLOOD PRESSURE: 150 MMHG

## 2022-06-14 DIAGNOSIS — R31.29 MICROSCOPIC HEMATURIA: ICD-10-CM

## 2022-06-14 PROBLEM — J30.2 SEASONAL ALLERGIES: Status: RESOLVED | Noted: 2019-02-14 | Resolved: 2022-06-14

## 2022-06-14 PROBLEM — Z00.00 ANNUAL PHYSICAL EXAM: Status: RESOLVED | Noted: 2022-05-12 | Resolved: 2022-06-14

## 2022-06-14 PROCEDURE — 3077F SYST BP >= 140 MM HG: CPT | Mod: CPTII,S$GLB,, | Performed by: NURSE PRACTITIONER

## 2022-06-14 PROCEDURE — 3008F BODY MASS INDEX DOCD: CPT | Mod: CPTII,S$GLB,, | Performed by: NURSE PRACTITIONER

## 2022-06-14 PROCEDURE — 4010F ACE/ARB THERAPY RXD/TAKEN: CPT | Mod: CPTII,S$GLB,, | Performed by: NURSE PRACTITIONER

## 2022-06-14 PROCEDURE — 3008F PR BODY MASS INDEX (BMI) DOCUMENTED: ICD-10-PCS | Mod: CPTII,S$GLB,, | Performed by: NURSE PRACTITIONER

## 2022-06-14 PROCEDURE — 3078F DIAST BP <80 MM HG: CPT | Mod: CPTII,S$GLB,, | Performed by: NURSE PRACTITIONER

## 2022-06-14 PROCEDURE — 99999 PR PBB SHADOW E&M-EST. PATIENT-LVL IV: CPT | Mod: PBBFAC,,, | Performed by: NURSE PRACTITIONER

## 2022-06-14 PROCEDURE — 99999 PR PBB SHADOW E&M-EST. PATIENT-LVL IV: ICD-10-PCS | Mod: PBBFAC,,, | Performed by: NURSE PRACTITIONER

## 2022-06-14 PROCEDURE — 3077F PR MOST RECENT SYSTOLIC BLOOD PRESSURE >= 140 MM HG: ICD-10-PCS | Mod: CPTII,S$GLB,, | Performed by: NURSE PRACTITIONER

## 2022-06-14 PROCEDURE — 4010F PR ACE/ARB THEARPY RXD/TAKEN: ICD-10-PCS | Mod: CPTII,S$GLB,, | Performed by: NURSE PRACTITIONER

## 2022-06-14 PROCEDURE — 1159F PR MEDICATION LIST DOCUMENTED IN MEDICAL RECORD: ICD-10-PCS | Mod: CPTII,S$GLB,, | Performed by: NURSE PRACTITIONER

## 2022-06-14 PROCEDURE — 3078F PR MOST RECENT DIASTOLIC BLOOD PRESSURE < 80 MM HG: ICD-10-PCS | Mod: CPTII,S$GLB,, | Performed by: NURSE PRACTITIONER

## 2022-06-14 PROCEDURE — 87086 URINE CULTURE/COLONY COUNT: CPT | Performed by: NURSE PRACTITIONER

## 2022-06-14 PROCEDURE — 81001 URINALYSIS AUTO W/SCOPE: CPT | Performed by: NURSE PRACTITIONER

## 2022-06-14 PROCEDURE — 99204 OFFICE O/P NEW MOD 45 MIN: CPT | Mod: S$GLB,,, | Performed by: NURSE PRACTITIONER

## 2022-06-14 PROCEDURE — 1159F MED LIST DOCD IN RCRD: CPT | Mod: CPTII,S$GLB,, | Performed by: NURSE PRACTITIONER

## 2022-06-14 PROCEDURE — 99204 PR OFFICE/OUTPT VISIT, NEW, LEVL IV, 45-59 MIN: ICD-10-PCS | Mod: S$GLB,,, | Performed by: NURSE PRACTITIONER

## 2022-06-14 NOTE — PROGRESS NOTES
Chief Complaint:   Micro hematuria    HPI:   Patient is a 62-year-old female that is presenting with concerns of micro hematuria.  Patient was seen by primary care physician and urinalysis indicated micro hematuria.  No history of smoking.  No  cancers in her family.  Urine in clinic indicates trace blood, all other parameters negative.  PVR was 12 mL. No abd/pelvic pain and no exac/rel factors.  No gross  hematuria.  No urolithiasis.  No urinary bother.  No  history.    Allergies:  Patient has no known allergies.    Medications:  has a current medication list which includes the following prescription(s): b complex vitamins, biotin, cetirizine, diltiazem, dorzolamide-timolol 2-0.5%, estradiol, olmesartan, triamcinolone acetonide 0.1%, and cyanocobalamin (vitamin b-12).    Review of Systems:  General: No fever, chills, fatigability, or weight loss.  Skin: No rashes, itching, or changes in color or texture of skin.  Chest: Denies MEJIA, cyanosis, wheezing, cough, and sputum production.  Abdomen: Appetite fine. No weight loss. Denies diarrhea, abdominal pain, hematemesis, or blood in stool.  Musculoskeletal: No joint stiffness or swelling. Denies back pain.  : As above.  All other review of systems negative.    PMH:   has a past medical history of GERD with stricture, Gout, and Hypertension.    PSH:   has a past surgical history that includes Hysterectomy (1998); cyst removal hand (Left); Knee arthroscopy (Right, 05/03/2016); Colonoscopy (06/08/2016); and Esophageal dilation.    FamHx: family history includes Diabetes in her brother; Heart disease in her sister; Hypertension in her father; Multiple sclerosis in her daughter.    SocHx:  reports that she has never smoked. She has never used smokeless tobacco. She reports that she does not drink alcohol and does not use drugs.      Physical Exam:  Vitals:    06/14/22 1333   BP: (!) 150/59   Pulse: (!) 54     General: A&Ox3, no apparent distress, no deformities  Neck:  No masses, normal thyroid  Lungs: normal inspiration, no use of accessory muscles  Heart: normal pulse, no arrhythmias  Abdomen: Soft, NT, ND, no masses, no hernias, no hepatosplenomegaly  Lymphatic: Neck and groin nodes negative    Labs/Studies:   See HPI    Impression/Plan:   1. Micro hematuria  Urine sent for urinalysis and culture  Renal ultrasound  Return to clinic for cystoscopy

## 2022-06-15 LAB
BACTERIA #/AREA URNS AUTO: NORMAL /HPF
BILIRUB UR QL STRIP: NEGATIVE
CLARITY UR REFRACT.AUTO: CLEAR
COLOR UR AUTO: ABNORMAL
GLUCOSE UR QL STRIP: NEGATIVE
HGB UR QL STRIP: ABNORMAL
KETONES UR QL STRIP: NEGATIVE
LEUKOCYTE ESTERASE UR QL STRIP: NEGATIVE
MICROSCOPIC COMMENT: NORMAL
NITRITE UR QL STRIP: NEGATIVE
PH UR STRIP: 7 [PH] (ref 5–8)
PROT UR QL STRIP: NEGATIVE
RBC #/AREA URNS AUTO: 1 /HPF (ref 0–4)
SP GR UR STRIP: 1.01 (ref 1–1.03)
SQUAMOUS #/AREA URNS AUTO: 0 /HPF
URN SPEC COLLECT METH UR: ABNORMAL
WBC #/AREA URNS AUTO: 1 /HPF (ref 0–5)

## 2022-06-16 LAB — BACTERIA UR CULT: NORMAL

## 2022-06-16 NOTE — PROGRESS NOTES
Attempted to contact pt to inform her that the urine culture is negative for infection. No answer; vm is full.

## 2022-06-20 ENCOUNTER — HOSPITAL ENCOUNTER (OUTPATIENT)
Dept: RADIOLOGY | Facility: HOSPITAL | Age: 62
Discharge: HOME | End: 2022-06-20
Attending: NURSE PRACTITIONER
Payer: COMMERCIAL

## 2022-06-20 DIAGNOSIS — R31.29 MICROSCOPIC HEMATURIA: ICD-10-CM

## 2022-06-20 PROCEDURE — 76770 US EXAM ABDO BACK WALL COMP: CPT | Mod: 26,,, | Performed by: RADIOLOGY

## 2022-06-20 PROCEDURE — 76770 US RETROPERITONEAL COMPLETE: ICD-10-PCS | Mod: 26,,, | Performed by: RADIOLOGY

## 2022-06-20 PROCEDURE — 76770 US EXAM ABDO BACK WALL COMP: CPT | Mod: TC

## 2022-06-21 DIAGNOSIS — N28.89 OTHER SPECIFIED DISORDERS OF KIDNEY AND URETER: ICD-10-CM

## 2022-06-21 DIAGNOSIS — N28.89 RENAL MASS: Primary | ICD-10-CM

## 2022-06-23 NOTE — PROGRESS NOTES
Patient is aware that renal ultrasound indicates a complex right renal mass. I called her and scheduled the CT scan that Ms. Dhillon wanted her to have.

## 2022-06-23 NOTE — PROGRESS NOTES
Patient is aware that renal ultrasound indicates a complex right renal mass.  I called her and scheduled the CT scan that Ms. Dhillon ordered.              Negative

## 2022-07-07 ENCOUNTER — PROCEDURE VISIT (OUTPATIENT)
Dept: UROLOGY | Facility: CLINIC | Age: 62
End: 2022-07-07
Payer: COMMERCIAL

## 2022-07-07 ENCOUNTER — LAB VISIT (OUTPATIENT)
Dept: LAB | Facility: HOSPITAL | Age: 62
End: 2022-07-07
Attending: UROLOGY
Payer: COMMERCIAL

## 2022-07-07 VITALS
BODY MASS INDEX: 21.81 KG/M2 | DIASTOLIC BLOOD PRESSURE: 73 MMHG | SYSTOLIC BLOOD PRESSURE: 156 MMHG | WEIGHT: 135.13 LBS | TEMPERATURE: 99 F | HEART RATE: 55 BPM

## 2022-07-07 DIAGNOSIS — N28.89 RENAL MASS: ICD-10-CM

## 2022-07-07 DIAGNOSIS — R31.21 ASYMPTOMATIC MICROSCOPIC HEMATURIA: Primary | ICD-10-CM

## 2022-07-07 LAB
CREAT SERPL-MCNC: 1 MG/DL (ref 0.5–1.4)
EST. GFR  (AFRICAN AMERICAN): >60 ML/MIN/1.73 M^2
EST. GFR  (NON AFRICAN AMERICAN): 59.7 ML/MIN/1.73 M^2

## 2022-07-07 PROCEDURE — 52000 PR CYSTOURETHROSCOPY: ICD-10-PCS | Mod: S$GLB,,, | Performed by: UROLOGY

## 2022-07-07 PROCEDURE — 52000 CYSTOURETHROSCOPY: CPT | Mod: S$GLB,,, | Performed by: UROLOGY

## 2022-07-07 PROCEDURE — 36415 COLL VENOUS BLD VENIPUNCTURE: CPT | Performed by: NURSE PRACTITIONER

## 2022-07-07 PROCEDURE — 82565 ASSAY OF CREATININE: CPT | Performed by: NURSE PRACTITIONER

## 2022-07-07 NOTE — PROCEDURES
Please schedule CPAP clinic for download Procedures     Procedure:  Diagnostic Cystoscopy    Indications: microhematuria    UA: normal, see lab results for values    Procedure in Detail: After proper consents were obtained, the patient was prepped and draped in normal sterile fashion for diagnostic cystoscopy. 5 ml of lidocaine jelly was instilled in the urethra. The flexible cystoscope was then introduced into the urethra, and advanced into the bladder under direct vision. The urethral mucosa appeared normal, and no strictures were noted. The sphincter was normal. The bladder neck was normal. Inspection of the interior of the bladder was then carried out. The trigone was unremarkable, with no mucosal lesions. The ureteral orifices were normal in position and configuration. Systematic inspection of the mucosa of the bladder it was then carried out, rotating the cystoscope so that all areas of the left and right lateral walls, the dome of the bladder, and the posterior wall were all visualized. The cystoscope was then advanced further into the bladder, and maximum deflection of the scope was performed so that the bladder neck could be inspected. No mucosal lesions were noted there. The cystoscope was then removed, and the procedure terminated. Patient tolerated the procedure well. No complications.     Findings: normal bladder, no tumors    Disposition:  - MARY noted complex cyst, CT with contrast ordered, will message with result    Efrain Sorenson MD

## 2022-07-25 ENCOUNTER — HOSPITAL ENCOUNTER (OUTPATIENT)
Dept: RADIOLOGY | Facility: HOSPITAL | Age: 62
Discharge: HOME | End: 2022-07-25
Attending: NURSE PRACTITIONER
Payer: COMMERCIAL

## 2022-07-25 DIAGNOSIS — N28.1 RENAL CYST: ICD-10-CM

## 2022-07-25 DIAGNOSIS — N28.89 OTHER SPECIFIED DISORDERS OF KIDNEY AND URETER: ICD-10-CM

## 2022-07-25 DIAGNOSIS — K76.89 LIVER CYST: Primary | ICD-10-CM

## 2022-07-25 PROCEDURE — 74170 CT ABD WO CNTRST FLWD CNTRST: CPT | Mod: TC

## 2022-07-25 PROCEDURE — 25500020 PHARM REV CODE 255: Performed by: NURSE PRACTITIONER

## 2022-07-25 RX ADMIN — IOHEXOL 100 ML: 350 INJECTION, SOLUTION INTRAVENOUS at 08:07

## 2022-08-23 ENCOUNTER — PATIENT MESSAGE (OUTPATIENT)
Dept: HEPATOLOGY | Facility: CLINIC | Age: 62
End: 2022-08-23
Payer: COMMERCIAL

## 2022-09-06 ENCOUNTER — LAB VISIT (OUTPATIENT)
Dept: LAB | Facility: HOSPITAL | Age: 62
End: 2022-09-06
Attending: INTERNAL MEDICINE
Payer: COMMERCIAL

## 2022-09-06 ENCOUNTER — OFFICE VISIT (OUTPATIENT)
Dept: HEPATOLOGY | Facility: CLINIC | Age: 62
End: 2022-09-06
Payer: COMMERCIAL

## 2022-09-06 VITALS
HEART RATE: 66 BPM | DIASTOLIC BLOOD PRESSURE: 80 MMHG | BODY MASS INDEX: 22.9 KG/M2 | SYSTOLIC BLOOD PRESSURE: 140 MMHG | HEIGHT: 66 IN | WEIGHT: 142.5 LBS

## 2022-09-06 DIAGNOSIS — K76.89 LIVER CYST: ICD-10-CM

## 2022-09-06 LAB
AFP SERPL-MCNC: 5.8 NG/ML (ref 0–8.4)
CANCER AG19-9 SERPL-ACNC: 18.7 U/ML (ref 0–40)
HBV CORE AB SERPL QL IA: NORMAL
HBV SURFACE AG SERPL QL IA: NORMAL
IGA SERPL-MCNC: 134 MG/DL (ref 40–350)
IGG SERPL-MCNC: 1112 MG/DL (ref 650–1600)
IGM SERPL-MCNC: 91 MG/DL (ref 50–300)

## 2022-09-06 PROCEDURE — 1159F MED LIST DOCD IN RCRD: CPT | Mod: CPTII,S$GLB,, | Performed by: INTERNAL MEDICINE

## 2022-09-06 PROCEDURE — 86301 IMMUNOASSAY TUMOR CA 19-9: CPT | Performed by: INTERNAL MEDICINE

## 2022-09-06 PROCEDURE — 82787 IGG 1 2 3 OR 4 EACH: CPT | Performed by: INTERNAL MEDICINE

## 2022-09-06 PROCEDURE — 82105 ALPHA-FETOPROTEIN SERUM: CPT | Performed by: INTERNAL MEDICINE

## 2022-09-06 PROCEDURE — 3079F PR MOST RECENT DIASTOLIC BLOOD PRESSURE 80-89 MM HG: ICD-10-PCS | Mod: CPTII,S$GLB,, | Performed by: INTERNAL MEDICINE

## 2022-09-06 PROCEDURE — 99204 PR OFFICE/OUTPT VISIT, NEW, LEVL IV, 45-59 MIN: ICD-10-PCS | Mod: S$GLB,,, | Performed by: INTERNAL MEDICINE

## 2022-09-06 PROCEDURE — 4010F PR ACE/ARB THEARPY RXD/TAKEN: ICD-10-PCS | Mod: CPTII,S$GLB,, | Performed by: INTERNAL MEDICINE

## 2022-09-06 PROCEDURE — 1160F PR REVIEW ALL MEDS BY PRESCRIBER/CLIN PHARMACIST DOCUMENTED: ICD-10-PCS | Mod: CPTII,S$GLB,, | Performed by: INTERNAL MEDICINE

## 2022-09-06 PROCEDURE — 3077F SYST BP >= 140 MM HG: CPT | Mod: CPTII,S$GLB,, | Performed by: INTERNAL MEDICINE

## 2022-09-06 PROCEDURE — 99204 OFFICE O/P NEW MOD 45 MIN: CPT | Mod: S$GLB,,, | Performed by: INTERNAL MEDICINE

## 2022-09-06 PROCEDURE — 82784 ASSAY IGA/IGD/IGG/IGM EACH: CPT | Performed by: INTERNAL MEDICINE

## 2022-09-06 PROCEDURE — 3008F BODY MASS INDEX DOCD: CPT | Mod: CPTII,S$GLB,, | Performed by: INTERNAL MEDICINE

## 2022-09-06 PROCEDURE — 3077F PR MOST RECENT SYSTOLIC BLOOD PRESSURE >= 140 MM HG: ICD-10-PCS | Mod: CPTII,S$GLB,, | Performed by: INTERNAL MEDICINE

## 2022-09-06 PROCEDURE — 99999 PR PBB SHADOW E&M-EST. PATIENT-LVL V: CPT | Mod: PBBFAC,,, | Performed by: INTERNAL MEDICINE

## 2022-09-06 PROCEDURE — 86038 ANTINUCLEAR ANTIBODIES: CPT | Performed by: INTERNAL MEDICINE

## 2022-09-06 PROCEDURE — 1160F RVW MEDS BY RX/DR IN RCRD: CPT | Mod: CPTII,S$GLB,, | Performed by: INTERNAL MEDICINE

## 2022-09-06 PROCEDURE — 86256 FLUORESCENT ANTIBODY TITER: CPT | Performed by: INTERNAL MEDICINE

## 2022-09-06 PROCEDURE — 86704 HEP B CORE ANTIBODY TOTAL: CPT | Performed by: INTERNAL MEDICINE

## 2022-09-06 PROCEDURE — 1159F PR MEDICATION LIST DOCUMENTED IN MEDICAL RECORD: ICD-10-PCS | Mod: CPTII,S$GLB,, | Performed by: INTERNAL MEDICINE

## 2022-09-06 PROCEDURE — 3079F DIAST BP 80-89 MM HG: CPT | Mod: CPTII,S$GLB,, | Performed by: INTERNAL MEDICINE

## 2022-09-06 PROCEDURE — 87340 HEPATITIS B SURFACE AG IA: CPT | Performed by: INTERNAL MEDICINE

## 2022-09-06 PROCEDURE — 3008F PR BODY MASS INDEX (BMI) DOCUMENTED: ICD-10-PCS | Mod: CPTII,S$GLB,, | Performed by: INTERNAL MEDICINE

## 2022-09-06 PROCEDURE — 4010F ACE/ARB THERAPY RXD/TAKEN: CPT | Mod: CPTII,S$GLB,, | Performed by: INTERNAL MEDICINE

## 2022-09-06 PROCEDURE — 80321 ALCOHOLS BIOMARKERS 1OR 2: CPT | Performed by: INTERNAL MEDICINE

## 2022-09-06 PROCEDURE — 99999 PR PBB SHADOW E&M-EST. PATIENT-LVL V: ICD-10-PCS | Mod: PBBFAC,,, | Performed by: INTERNAL MEDICINE

## 2022-09-06 PROCEDURE — 86235 NUCLEAR ANTIGEN ANTIBODY: CPT | Mod: 91 | Performed by: INTERNAL MEDICINE

## 2022-09-06 PROCEDURE — 36415 COLL VENOUS BLD VENIPUNCTURE: CPT | Performed by: INTERNAL MEDICINE

## 2022-09-06 RX ORDER — AMLODIPINE BESYLATE 5 MG/1
5 TABLET ORAL DAILY
COMMUNITY
Start: 2022-08-10

## 2022-09-06 NOTE — PROGRESS NOTES
Subjective:     Adelina Espitia is here for evaluation of liver lesion that was found on ultrasound of the abdomen      History of Present Illness:  Mr. Espitia is a 62-year-old female with a history of hypertension, hyperlipidemia, on estradiol oral supplement for severe hot flashes.  She had an ultrasound of the abdomen on 06/20/2022 that revealed a 2.4 cm complex cyst in the kidney, as a follow-up she had CT scan of the abdomen on 07/25 that revealed multiple tiny hepatic cysts.    Incidental-yes  Symptomatic-no  Estrogen containing medication-yes  Anabolic steroids- no  Obesity- no  Metabolic syndrome- no    Review of Systems   Constitutional:  Negative for fatigue, fever and unexpected weight change.   Gastrointestinal:  Negative for abdominal distention, abdominal pain, blood in stool, nausea and vomiting.   Musculoskeletal:  Negative for arthralgias and gait problem.   Skin:  Negative for pallor and rash.   Neurological:  Negative for dizziness.   Hematological:  Does not bruise/bleed easily.   Psychiatric/Behavioral:  Negative for confusion, hallucinations and sleep disturbance.        Objective:     Physical Exam  Vitals reviewed.   Constitutional:       Appearance: Normal appearance.   Eyes:      General: No scleral icterus.  Cardiovascular:      Heart sounds: No murmur heard.  Pulmonary:      Effort: Pulmonary effort is normal.      Breath sounds: No wheezing, rhonchi or rales.   Abdominal:      General: Bowel sounds are normal. There is no distension.      Palpations: There is no mass.      Tenderness: There is no abdominal tenderness.   Musculoskeletal:         General: No tenderness.      Right lower leg: No edema.      Left lower leg: No edema.   Lymphadenopathy:      Cervical: No cervical adenopathy.   Skin:     Coloration: Skin is not jaundiced.      Findings: No bruising or rash.   Neurological:      Mental Status: She is alert and oriented to person, place, and time.      Coordination:  Coordination normal.   Psychiatric:         Behavior: Behavior normal.         Thought Content: Thought content normal.         Computed MELD-Na score unavailable. Necessary lab results were not found in the last year.  Computed MELD score unavailable. Necessary lab results were not found in the last year.    WBC   Date Value Ref Range Status   05/12/2022 2.44 (L) 3.90 - 12.70 K/uL Final     Hemoglobin   Date Value Ref Range Status   05/12/2022 14.4 12.0 - 16.0 g/dL Final     Hematocrit   Date Value Ref Range Status   05/12/2022 45.6 37.0 - 48.5 % Final     Platelets   Date Value Ref Range Status   05/12/2022 233 150 - 450 K/uL Final     BUN   Date Value Ref Range Status   05/12/2022 15 8 - 23 mg/dL Final     Creatinine   Date Value Ref Range Status   07/07/2022 1.0 0.5 - 1.4 mg/dL Final     Glucose   Date Value Ref Range Status   05/12/2022 90 70 - 110 mg/dL Final     Calcium   Date Value Ref Range Status   05/12/2022 9.8 8.7 - 10.5 mg/dL Final     Sodium   Date Value Ref Range Status   05/12/2022 142 136 - 145 mmol/L Final     Potassium   Date Value Ref Range Status   05/12/2022 4.0 3.5 - 5.1 mmol/L Final     Chloride   Date Value Ref Range Status   05/12/2022 106 95 - 110 mmol/L Final     AST   Date Value Ref Range Status   05/12/2022 23 10 - 40 U/L Final     ALT   Date Value Ref Range Status   05/12/2022 22 10 - 44 U/L Final     Alkaline Phosphatase   Date Value Ref Range Status   05/12/2022 47 (L) 55 - 135 U/L Final     Total Bilirubin   Date Value Ref Range Status   05/12/2022 0.9 0.1 - 1.0 mg/dL Final     Comment:     For infants and newborns, interpretation of results should be based  on gestational age, weight and in agreement with clinical  observations.    Premature Infant recommended reference ranges:  Up to 24 hours.............<8.0 mg/dL  Up to 48 hours............<12.0 mg/dL  3-5 days..................<15.0 mg/dL  6-29 days.................<15.0 mg/dL       Albumin   Date Value Ref Range Status    05/12/2022 4.5 3.5 - 5.2 g/dL Final     No results found for: INR      Assessment/Plan:      1. Multiple liver cysts:     Multiple tiny liver cysts, too small to characterize at this time.  These blood incidental, asymptomatic, unlikely malignant, however will obtain tumor markers and continue surveillance imaging with an ultrasound Q 6-12 months to monitor growth as they tend to increase in size with age and also malignant potential increases with age.  Oral hormone supplements slightly increase the risk for malignant transformation, She would like to continue estradiol supplements for now.     2. Complex renal cyst    I have reviewed existing labs, imaging.  Educated patient about disease process, prognosis. Ordered required labs, images, procedures and discussed treatment plan.         Uyen Jensen MD  Transplant Hepatologist  Dept of Hepatology, [place]  [facility]

## 2022-09-07 LAB — ANA SER QL IF: NORMAL

## 2022-09-08 LAB
MITOCHONDRIA AB TITR SER IF: NORMAL {TITER}
SMOOTH MUSCLE AB TITR SER IF: NORMAL {TITER}

## 2022-09-09 LAB
IGG4 SER-MCNC: 35 MG/DL (ref 4–86)
PETH 16:0/18.1 (POPETH): <10 NG/ML
PETH 16:0/18.2 (PLPETH): <10 NG/ML

## 2022-09-15 ENCOUNTER — PATIENT MESSAGE (OUTPATIENT)
Dept: HEPATOLOGY | Facility: CLINIC | Age: 62
End: 2022-09-15
Payer: COMMERCIAL

## 2022-12-05 ENCOUNTER — HOSPITAL ENCOUNTER (OUTPATIENT)
Dept: RADIOLOGY | Facility: HOSPITAL | Age: 62
Discharge: HOME | End: 2022-12-05
Attending: FAMILY MEDICINE
Payer: COMMERCIAL

## 2022-12-05 ENCOUNTER — OFFICE VISIT (OUTPATIENT)
Dept: INTERNAL MEDICINE | Facility: CLINIC | Age: 62
End: 2022-12-05
Payer: COMMERCIAL

## 2022-12-05 VITALS
OXYGEN SATURATION: 99 % | DIASTOLIC BLOOD PRESSURE: 78 MMHG | BODY MASS INDEX: 23.2 KG/M2 | HEIGHT: 66 IN | TEMPERATURE: 99 F | HEART RATE: 81 BPM | WEIGHT: 144.38 LBS | SYSTOLIC BLOOD PRESSURE: 142 MMHG

## 2022-12-05 DIAGNOSIS — M79.89 RIGHT LEG SWELLING: Primary | ICD-10-CM

## 2022-12-05 DIAGNOSIS — L50.9 URTICARIA: ICD-10-CM

## 2022-12-05 DIAGNOSIS — M79.89 RIGHT LEG SWELLING: ICD-10-CM

## 2022-12-05 DIAGNOSIS — R55 SYNCOPE, UNSPECIFIED SYNCOPE TYPE: ICD-10-CM

## 2022-12-05 PROCEDURE — 93971 EXTREMITY STUDY: CPT | Mod: TC,RT

## 2022-12-05 PROCEDURE — 99999 PR PBB SHADOW E&M-EST. PATIENT-LVL III: ICD-10-PCS | Mod: PBBFAC,,, | Performed by: FAMILY MEDICINE

## 2022-12-05 PROCEDURE — 3077F PR MOST RECENT SYSTOLIC BLOOD PRESSURE >= 140 MM HG: ICD-10-PCS | Mod: CPTII,S$GLB,, | Performed by: FAMILY MEDICINE

## 2022-12-05 PROCEDURE — 99214 OFFICE O/P EST MOD 30 MIN: CPT | Mod: S$GLB,,, | Performed by: FAMILY MEDICINE

## 2022-12-05 PROCEDURE — 3077F SYST BP >= 140 MM HG: CPT | Mod: CPTII,S$GLB,, | Performed by: FAMILY MEDICINE

## 2022-12-05 PROCEDURE — 4010F PR ACE/ARB THEARPY RXD/TAKEN: ICD-10-PCS | Mod: CPTII,S$GLB,, | Performed by: FAMILY MEDICINE

## 2022-12-05 PROCEDURE — 3078F DIAST BP <80 MM HG: CPT | Mod: CPTII,S$GLB,, | Performed by: FAMILY MEDICINE

## 2022-12-05 PROCEDURE — 3078F PR MOST RECENT DIASTOLIC BLOOD PRESSURE < 80 MM HG: ICD-10-PCS | Mod: CPTII,S$GLB,, | Performed by: FAMILY MEDICINE

## 2022-12-05 PROCEDURE — 99999 PR PBB SHADOW E&M-EST. PATIENT-LVL III: CPT | Mod: PBBFAC,,, | Performed by: FAMILY MEDICINE

## 2022-12-05 PROCEDURE — 4010F ACE/ARB THERAPY RXD/TAKEN: CPT | Mod: CPTII,S$GLB,, | Performed by: FAMILY MEDICINE

## 2022-12-05 PROCEDURE — 3008F PR BODY MASS INDEX (BMI) DOCUMENTED: ICD-10-PCS | Mod: CPTII,S$GLB,, | Performed by: FAMILY MEDICINE

## 2022-12-05 PROCEDURE — 3008F BODY MASS INDEX DOCD: CPT | Mod: CPTII,S$GLB,, | Performed by: FAMILY MEDICINE

## 2022-12-05 PROCEDURE — 99214 PR OFFICE/OUTPT VISIT, EST, LEVL IV, 30-39 MIN: ICD-10-PCS | Mod: S$GLB,,, | Performed by: FAMILY MEDICINE

## 2022-12-05 RX ORDER — FAMOTIDINE 20 MG/1
20 TABLET, FILM COATED ORAL 2 TIMES DAILY
Qty: 60 TABLET | Refills: 11
Start: 2022-12-05 | End: 2023-07-14

## 2022-12-05 RX ORDER — MINERAL OIL
180 ENEMA (ML) RECTAL DAILY
Qty: 30 TABLET | Refills: 0
Start: 2022-12-05 | End: 2023-07-14

## 2022-12-05 NOTE — PROGRESS NOTES
Subjective:       Patient ID: Adelina Espitia is a 62 y.o. female.    Chief Complaint: Other (Hives)    She had cataract surgery last Tuesday 11/29.  2-3 days later she developed generalized urticaria.  She took 1/2 of Zyrtec at bedtime at a next morning felt lightheaded and had a previous syncopal episode.  She denies chest pain.  She reports she felt like her heart was racing for short while after but not before the syncopal episode.  She denies a shortness of breath cough or wheezing.  She also knows some swelling right lower leg.  She continues on post cataract eyedrops that include steroid and antibiotic.  She has no local reaction.  She is been on Benicar for long time with no prior urticarial symptoms.  She reports urticaria lessened    Review of Systems   Constitutional:  Negative for chills and fever.   HENT:  Negative for congestion.    Respiratory:  Negative for cough, chest tightness, shortness of breath and wheezing.    Cardiovascular:  Positive for palpitations. Negative for chest pain and leg swelling.   Gastrointestinal:  Positive for nausea. Negative for diarrhea and vomiting.   Neurological:  Positive for syncope and light-headedness. Negative for dizziness, weakness and headaches.     Objective:      Physical Exam  Constitutional:       General: She is not in acute distress.     Appearance: She is not ill-appearing or diaphoretic.   Cardiovascular:      Rate and Rhythm: Normal rate and regular rhythm.      Heart sounds: No murmur heard.    No gallop.      Comments: She has mild diffuse swelling of the right lower leg with no tenderness.  She has no swelling of the left lower leg  Pulmonary:      Effort: Pulmonary effort is normal. No respiratory distress.      Breath sounds: No wheezing, rhonchi or rales.   Abdominal:      General: There is no distension.   Lymphadenopathy:      Cervical: No cervical adenopathy.   Skin:     Comments: Mild urticaria of the extremities   Neurological:       Mental Status: She is alert.       Lab Visit on 09/06/2022   Component Date Value Ref Range Status    Hepatitis B Surface Ag 09/06/2022 Non-reactive  Non-reactive Final    AMY Screen 09/06/2022 Negative <1:80  Negative <1:80 Final    Anti-Mitochon Ab IFA 09/06/2022 Negative 1:40  Negative Final    IgG 4 09/06/2022 35  4 - 86 mg/dL Final    IgG 09/06/2022 1112  650 - 1600 mg/dL Final    IgA 09/06/2022 134  40 - 350 mg/dL Final    IgM 09/06/2022 91  50 - 300 mg/dL Final    AFP 09/06/2022 5.8  0.0 - 8.4 ng/mL Final    PEth 16:0/18.1 (POPEth) 09/06/2022 <10  ng/mL Final    PEth 16:0/18.2 (PLPEth) 09/06/2022 <10  ng/mL Final    Hep B Core Total Ab 09/06/2022 Non-reactive  Non-reactive Final    Smooth Muscle Ab 09/06/2022 Negative 1:40  Negative Final    CA 19-9 09/06/2022 18.7  0.0 - 40.0 U/mL Final     Assessment:       1. Right leg swelling    2. Urticaria    3. Syncope, unspecified syncope type          Plan:     Urticaria syncope post cataract surgery.  Will ultrasound lower extremity rule out DVT.  Recommend Allegra Pepcid 20 mg twice a day avoid aspirin ibuprofen Tylenol okay follow-up 1 week.  Observation for chest tightness palpitations shortness of breath or wheezing    Right leg swelling  -     US Lower Extremity Veins Right; Future; Expected date: 12/05/2022    Urticaria    Syncope, unspecified syncope type

## 2022-12-12 ENCOUNTER — OFFICE VISIT (OUTPATIENT)
Dept: INTERNAL MEDICINE | Facility: CLINIC | Age: 62
End: 2022-12-12
Payer: COMMERCIAL

## 2022-12-12 VITALS
HEART RATE: 66 BPM | TEMPERATURE: 98 F | HEIGHT: 66 IN | SYSTOLIC BLOOD PRESSURE: 138 MMHG | WEIGHT: 142.44 LBS | OXYGEN SATURATION: 97 % | BODY MASS INDEX: 22.89 KG/M2 | DIASTOLIC BLOOD PRESSURE: 84 MMHG

## 2022-12-12 DIAGNOSIS — I10 PRIMARY HYPERTENSION: ICD-10-CM

## 2022-12-12 DIAGNOSIS — L50.9 URTICARIA: Primary | ICD-10-CM

## 2022-12-12 DIAGNOSIS — M79.89 RIGHT LEG SWELLING: ICD-10-CM

## 2022-12-12 PROCEDURE — 3075F SYST BP GE 130 - 139MM HG: CPT | Mod: CPTII,S$GLB,, | Performed by: FAMILY MEDICINE

## 2022-12-12 PROCEDURE — 99999 PR PBB SHADOW E&M-EST. PATIENT-LVL IV: ICD-10-PCS | Mod: PBBFAC,,, | Performed by: FAMILY MEDICINE

## 2022-12-12 PROCEDURE — 99213 OFFICE O/P EST LOW 20 MIN: CPT | Mod: S$GLB,,, | Performed by: FAMILY MEDICINE

## 2022-12-12 PROCEDURE — 99999 PR PBB SHADOW E&M-EST. PATIENT-LVL IV: CPT | Mod: PBBFAC,,, | Performed by: FAMILY MEDICINE

## 2022-12-12 PROCEDURE — 4010F PR ACE/ARB THEARPY RXD/TAKEN: ICD-10-PCS | Mod: CPTII,S$GLB,, | Performed by: FAMILY MEDICINE

## 2022-12-12 PROCEDURE — 3079F PR MOST RECENT DIASTOLIC BLOOD PRESSURE 80-89 MM HG: ICD-10-PCS | Mod: CPTII,S$GLB,, | Performed by: FAMILY MEDICINE

## 2022-12-12 PROCEDURE — 4010F ACE/ARB THERAPY RXD/TAKEN: CPT | Mod: CPTII,S$GLB,, | Performed by: FAMILY MEDICINE

## 2022-12-12 PROCEDURE — 3075F PR MOST RECENT SYSTOLIC BLOOD PRESS GE 130-139MM HG: ICD-10-PCS | Mod: CPTII,S$GLB,, | Performed by: FAMILY MEDICINE

## 2022-12-12 PROCEDURE — 3079F DIAST BP 80-89 MM HG: CPT | Mod: CPTII,S$GLB,, | Performed by: FAMILY MEDICINE

## 2022-12-12 PROCEDURE — 1159F PR MEDICATION LIST DOCUMENTED IN MEDICAL RECORD: ICD-10-PCS | Mod: CPTII,S$GLB,, | Performed by: FAMILY MEDICINE

## 2022-12-12 PROCEDURE — 1159F MED LIST DOCD IN RCRD: CPT | Mod: CPTII,S$GLB,, | Performed by: FAMILY MEDICINE

## 2022-12-12 PROCEDURE — 3008F BODY MASS INDEX DOCD: CPT | Mod: CPTII,S$GLB,, | Performed by: FAMILY MEDICINE

## 2022-12-12 PROCEDURE — 3008F PR BODY MASS INDEX (BMI) DOCUMENTED: ICD-10-PCS | Mod: CPTII,S$GLB,, | Performed by: FAMILY MEDICINE

## 2022-12-12 PROCEDURE — 99213 PR OFFICE/OUTPT VISIT, EST, LEVL III, 20-29 MIN: ICD-10-PCS | Mod: S$GLB,,, | Performed by: FAMILY MEDICINE

## 2022-12-12 NOTE — PROGRESS NOTES
Subjective:       Patient ID: Adelina Espitia is a 62 y.o. female.    Chief Complaint: Follow-up    Follow-up urticaria.  She reports resolution of lesions she does have some itchiness and dryness of skin were urticaria was.  She denies any fever chills trouble swallowing cough nausea diarrhea.  She continues on Allegra daily and Pepcid twice a day.    Follow-up  Pertinent negatives include no chest pain, chills, coughing, fever, headaches, nausea, vomiting or weakness.   Review of Systems   Constitutional:  Negative for chills and fever.   Respiratory:  Negative for cough, chest tightness and shortness of breath.    Cardiovascular:  Negative for chest pain, palpitations and leg swelling.   Gastrointestinal:  Negative for abdominal distention, diarrhea, nausea and vomiting.   Neurological:  Negative for dizziness, weakness, light-headedness and headaches.     Objective:      Physical Exam  Constitutional:       General: She is not in acute distress.     Appearance: She is not ill-appearing or diaphoretic.   Cardiovascular:      Rate and Rhythm: Normal rate and regular rhythm.      Heart sounds: No murmur heard.    No gallop.   Pulmonary:      Effort: Pulmonary effort is normal. No respiratory distress.      Breath sounds: No wheezing, rhonchi or rales.   Abdominal:      General: There is no distension.   Lymphadenopathy:      Cervical: No cervical adenopathy.   Skin:     Comments: Mild peeling of her upper arms in area of urticaria   Neurological:      Mental Status: She is alert.       Lab Visit on 09/06/2022   Component Date Value Ref Range Status    Hepatitis B Surface Ag 09/06/2022 Non-reactive  Non-reactive Final    AMY Screen 09/06/2022 Negative <1:80  Negative <1:80 Final    Anti-Mitochon Ab IFA 09/06/2022 Negative 1:40  Negative Final    IgG 4 09/06/2022 35  4 - 86 mg/dL Final    IgG 09/06/2022 1112  650 - 1600 mg/dL Final    IgA 09/06/2022 134  40 - 350 mg/dL Final    IgM 09/06/2022 91  50 - 300  mg/dL Final    AFP 09/06/2022 5.8  0.0 - 8.4 ng/mL Final    PEth 16:0/18.1 (POPEth) 09/06/2022 <10  ng/mL Final    PEth 16:0/18.2 (PLPEth) 09/06/2022 <10  ng/mL Final    Hep B Core Total Ab 09/06/2022 Non-reactive  Non-reactive Final    Smooth Muscle Ab 09/06/2022 Negative 1:40  Negative Final    CA 19-9 09/06/2022 18.7  0.0 - 40.0 U/mL Final     Assessment:       1. Urticaria    2. Right leg swelling    3. Primary hypertension        Plan:     Urticaria resolve leg swelling resolved recommend continue Allegra Pepcid for additional 14 days.  Follow-up if needed.    Urticaria    Right leg swelling    Primary hypertension

## 2023-03-06 ENCOUNTER — HOSPITAL ENCOUNTER (OUTPATIENT)
Dept: RADIOLOGY | Facility: HOSPITAL | Age: 63
Discharge: HOME | End: 2023-03-06
Attending: INTERNAL MEDICINE
Payer: COMMERCIAL

## 2023-03-06 DIAGNOSIS — K76.89 LIVER CYST: ICD-10-CM

## 2023-03-06 PROCEDURE — 76705 ECHO EXAM OF ABDOMEN: CPT | Mod: TC

## 2023-03-06 PROCEDURE — 76705 US ABDOMEN LIMITED: ICD-10-PCS | Mod: 26,,, | Performed by: RADIOLOGY

## 2023-03-06 PROCEDURE — 76705 ECHO EXAM OF ABDOMEN: CPT | Mod: 26,,, | Performed by: RADIOLOGY

## 2023-07-07 ENCOUNTER — HOSPITAL ENCOUNTER (OUTPATIENT)
Dept: RADIOLOGY | Facility: HOSPITAL | Age: 63
Discharge: HOME | End: 2023-07-07
Attending: NURSE PRACTITIONER
Payer: COMMERCIAL

## 2023-07-07 DIAGNOSIS — N28.1 RENAL CYST: ICD-10-CM

## 2023-07-07 PROCEDURE — 76770 US EXAM ABDO BACK WALL COMP: CPT | Mod: TC

## 2023-07-07 PROCEDURE — 76770 US EXAM ABDO BACK WALL COMP: CPT | Mod: 26,,, | Performed by: RADIOLOGY

## 2023-07-07 PROCEDURE — 76770 US RETROPERITONEAL COMPLETE: ICD-10-PCS | Mod: 26,,, | Performed by: RADIOLOGY

## 2023-07-10 ENCOUNTER — TELEPHONE (OUTPATIENT)
Dept: UROLOGY | Facility: CLINIC | Age: 63
End: 2023-07-10
Payer: COMMERCIAL

## 2023-07-10 NOTE — TELEPHONE ENCOUNTER
----- Message from Saray Dhillon NP sent at 7/10/2023  7:57 AM CDT -----  Patient needs a return visit to review ultrasound results.  Please call and assist her with scheduling this appointment.

## 2023-07-11 ENCOUNTER — OFFICE VISIT (OUTPATIENT)
Dept: UROLOGY | Facility: CLINIC | Age: 63
End: 2023-07-11
Payer: COMMERCIAL

## 2023-07-11 VITALS — DIASTOLIC BLOOD PRESSURE: 74 MMHG | HEART RATE: 72 BPM | SYSTOLIC BLOOD PRESSURE: 145 MMHG

## 2023-07-11 DIAGNOSIS — N28.1 RENAL CYST: Primary | ICD-10-CM

## 2023-07-11 PROBLEM — R55 SYNCOPE: Status: RESOLVED | Noted: 2022-12-05 | Resolved: 2023-07-11

## 2023-07-11 PROBLEM — M79.89 RIGHT LEG SWELLING: Status: RESOLVED | Noted: 2022-12-05 | Resolved: 2023-07-11

## 2023-07-11 PROBLEM — L50.9 URTICARIA: Status: RESOLVED | Noted: 2022-12-05 | Resolved: 2023-07-11

## 2023-07-11 PROBLEM — L23.9 ALLERGIC CONTACT DERMATITIS: Status: RESOLVED | Noted: 2022-05-12 | Resolved: 2023-07-11

## 2023-07-11 LAB
BACTERIA #/AREA URNS AUTO: ABNORMAL /HPF
HYALINE CASTS UR QL AUTO: 290 /LPF
MICROSCOPIC COMMENT: ABNORMAL
RBC #/AREA URNS AUTO: 6 /HPF (ref 0–4)
SQUAMOUS #/AREA URNS AUTO: 4 /HPF
WBC #/AREA URNS AUTO: 22 /HPF (ref 0–5)

## 2023-07-11 PROCEDURE — 3288F PR FALLS RISK ASSESSMENT DOCUMENTED: ICD-10-PCS | Mod: CPTII,S$GLB,, | Performed by: NURSE PRACTITIONER

## 2023-07-11 PROCEDURE — 3078F DIAST BP <80 MM HG: CPT | Mod: CPTII,S$GLB,, | Performed by: NURSE PRACTITIONER

## 2023-07-11 PROCEDURE — 3078F PR MOST RECENT DIASTOLIC BLOOD PRESSURE < 80 MM HG: ICD-10-PCS | Mod: CPTII,S$GLB,, | Performed by: NURSE PRACTITIONER

## 2023-07-11 PROCEDURE — 99999 PR PBB SHADOW E&M-EST. PATIENT-LVL III: CPT | Mod: PBBFAC,,, | Performed by: NURSE PRACTITIONER

## 2023-07-11 PROCEDURE — 3077F SYST BP >= 140 MM HG: CPT | Mod: CPTII,S$GLB,, | Performed by: NURSE PRACTITIONER

## 2023-07-11 PROCEDURE — 99214 OFFICE O/P EST MOD 30 MIN: CPT | Mod: S$GLB,,, | Performed by: NURSE PRACTITIONER

## 2023-07-11 PROCEDURE — 99999 PR PBB SHADOW E&M-EST. PATIENT-LVL III: ICD-10-PCS | Mod: PBBFAC,,, | Performed by: NURSE PRACTITIONER

## 2023-07-11 PROCEDURE — 81001 URINALYSIS AUTO W/SCOPE: CPT | Performed by: NURSE PRACTITIONER

## 2023-07-11 PROCEDURE — 99214 PR OFFICE/OUTPT VISIT, EST, LEVL IV, 30-39 MIN: ICD-10-PCS | Mod: S$GLB,,, | Performed by: NURSE PRACTITIONER

## 2023-07-11 PROCEDURE — 1159F PR MEDICATION LIST DOCUMENTED IN MEDICAL RECORD: ICD-10-PCS | Mod: CPTII,S$GLB,, | Performed by: NURSE PRACTITIONER

## 2023-07-11 PROCEDURE — 4010F PR ACE/ARB THEARPY RXD/TAKEN: ICD-10-PCS | Mod: CPTII,S$GLB,, | Performed by: NURSE PRACTITIONER

## 2023-07-11 PROCEDURE — 4010F ACE/ARB THERAPY RXD/TAKEN: CPT | Mod: CPTII,S$GLB,, | Performed by: NURSE PRACTITIONER

## 2023-07-11 PROCEDURE — 3288F FALL RISK ASSESSMENT DOCD: CPT | Mod: CPTII,S$GLB,, | Performed by: NURSE PRACTITIONER

## 2023-07-11 PROCEDURE — 1101F PT FALLS ASSESS-DOCD LE1/YR: CPT | Mod: CPTII,S$GLB,, | Performed by: NURSE PRACTITIONER

## 2023-07-11 PROCEDURE — 1101F PR PT FALLS ASSESS DOC 0-1 FALLS W/OUT INJ PAST YR: ICD-10-PCS | Mod: CPTII,S$GLB,, | Performed by: NURSE PRACTITIONER

## 2023-07-11 PROCEDURE — 1159F MED LIST DOCD IN RCRD: CPT | Mod: CPTII,S$GLB,, | Performed by: NURSE PRACTITIONER

## 2023-07-11 PROCEDURE — 87086 URINE CULTURE/COLONY COUNT: CPT | Performed by: NURSE PRACTITIONER

## 2023-07-11 PROCEDURE — 3077F PR MOST RECENT SYSTOLIC BLOOD PRESSURE >= 140 MM HG: ICD-10-PCS | Mod: CPTII,S$GLB,, | Performed by: NURSE PRACTITIONER

## 2023-07-11 NOTE — PROGRESS NOTES
Chief Complaint:   Micro hematuria  Renal cyst  HPI:   Patient is a 63-year-old female that is presenting as a follow-up to micro hematuria and renal cyst.Patient denies gross hematuria.  Urine in clinic indicates trace leukocytes, all other parameters negative.  Patient had a negative microhematuria workup,07/2022.  Renal ultrasound noted a complex cyst, CT renal mass protocol indicated a Bosniak 2, nonenhancing renal cyst.  Breeding  Procedure in Detail: After proper consents were obtained, the patient was prepped and draped in normal sterile fashion for diagnostic cystoscopy. 5 ml of lidocaine jelly was instilled in the urethra. The flexible cystoscope was then introduced into the urethra, and advanced into the bladder under direct vision. The urethral mucosa appeared normal, and no strictures were noted. The sphincter was normal. The bladder neck was normal. Inspection of the interior of the bladder was then carried out. The trigone was unremarkable, with no mucosal lesions. The ureteral orifices were normal in position and configuration. Systematic inspection of the mucosa of the bladder it was then carried out, rotating the cystoscope so that all areas of the left and right lateral walls, the dome of the bladder, and the posterior wall were all visualized. The cystoscope was then advanced further into the bladder, and maximum deflection of the scope was performed so that the bladder neck could be inspected. No mucosal lesions were noted there. The cystoscope was then removed, and the procedure terminated. Patient tolerated the procedure well. No  06/14/2022  Patient is a 62-year-old female that is presenting with concerns of micro hematuria.  Patient was seen by primary care physician and urinalysis indicated micro hematuria.  No history of smoking.  No  cancers in her family.  Urine in clinic indicates trace blood, all other parameters negative.  PVR was 12 mL. No abd/pelvic pain and no exac/rel factors.  No  gross  hematuria.  No urolithiasis.  No urinary bother.  No  history.      Allergies:  Patient has no known allergies.    Medications:  has a current medication list which includes the following prescription(s): amlodipine, b complex vitamins, biotin, cetirizine, cyanocobalamin (vitamin b-12), diltiazem, dorzolamide-timolol 2-0.5%, estradiol, famotidine, fexofenadine, and olmesartan.    Review of Systems:  General: No fever, chills, fatigability, or weight loss.  Skin: No rashes, itching, or changes in color or texture of skin.  Chest: Denies MEJIA, cyanosis, wheezing, cough, and sputum production.  Abdomen: Appetite fine. No weight loss. Denies diarrhea, abdominal pain, hematemesis, or blood in stool.  Musculoskeletal: No joint stiffness or swelling. Denies back pain.  : As above.  All other review of systems negative.    PMH:   has a past medical history of GERD with stricture, Gout, Hormone disorder, and Hypertension.    PSH:   has a past surgical history that includes Hysterectomy (1998); cyst removal hand (Left); Knee arthroscopy (Right, 05/03/2016); Colonoscopy (06/08/2016); and Esophageal dilation.    FamHx: family history includes Diabetes in her brother; Heart disease in her sister; Hypertension in her father; Multiple sclerosis in her daughter.    SocHx:  reports that she has never smoked. She has never used smokeless tobacco. She reports that she does not drink alcohol and does not use drugs.      Physical Exam:  Vitals:    07/11/23 1440   BP: (!) 145/74   Pulse: 72     General: A&Ox3, no apparent distress, no deformities  Neck: No masses, normal thyroid  Lungs: normal inspiration, no use of accessory muscles  Heart: normal pulse, no arrhythmias  Abdomen: Soft, NT, ND, no masses, no hernias, no hepatosplenomegaly  Lymphatic: Neck and groin nodes negative  Skin: The skin is warm and dry. No jaundice.    Labs/Studies:   07/07/2023  EXAMINATION:  US RETROPERITONEAL COMPLETE     CLINICAL HISTORY:  Cyst of  kidney, acquired     TECHNIQUE:  Routine imaging of the kidneys and bladder performed.     COMPARISON:  06/20/2022     FINDINGS:  Right kidney: The right kidney measures 11.3 cm. Echotexture normal.  2.4 cm lower pole cyst without detrimental change in internal complexity.  No nephrolithiasis.  No hydronephrosis.     Left kidney: The left kidney measures 11.4 cm. Echotexture normal.  No mass. No nephrolithiasis.  No hydronephrosis.     The bladder is unremarkable for distension.     Impression:     No acute abnormality or significant change in the right renal cyst    Impression/Plan:   1. Microhematuria  Urine was sent for culture and urinalysis, patient will be contacted with results.    2. Renal cyst  Reassuring renal ultrasound, will repeat in 12 months for surveillance and have patient to return to clinic to review.

## 2023-07-13 LAB — BACTERIA UR CULT: NO GROWTH

## 2023-07-14 ENCOUNTER — OFFICE VISIT (OUTPATIENT)
Dept: INTERNAL MEDICINE | Facility: CLINIC | Age: 63
End: 2023-07-14
Payer: MEDICARE

## 2023-07-14 VITALS
TEMPERATURE: 97 F | RESPIRATION RATE: 20 BRPM | BODY MASS INDEX: 23.42 KG/M2 | HEART RATE: 70 BPM | DIASTOLIC BLOOD PRESSURE: 88 MMHG | OXYGEN SATURATION: 97 % | HEIGHT: 66 IN | WEIGHT: 145.75 LBS | SYSTOLIC BLOOD PRESSURE: 162 MMHG

## 2023-07-14 DIAGNOSIS — M25.50 ARTHRALGIA, UNSPECIFIED JOINT: ICD-10-CM

## 2023-07-14 DIAGNOSIS — Z00.00 ANNUAL PHYSICAL EXAM: Primary | ICD-10-CM

## 2023-07-14 DIAGNOSIS — I10 PRIMARY HYPERTENSION: ICD-10-CM

## 2023-07-14 DIAGNOSIS — Z23 NEED FOR VACCINATION AGAINST STREPTOCOCCUS PNEUMONIAE: ICD-10-CM

## 2023-07-14 PROCEDURE — 1160F RVW MEDS BY RX/DR IN RCRD: CPT | Mod: CPTII,S$GLB,, | Performed by: PHYSICIAN ASSISTANT

## 2023-07-14 PROCEDURE — 3288F FALL RISK ASSESSMENT DOCD: CPT | Mod: CPTII,S$GLB,, | Performed by: PHYSICIAN ASSISTANT

## 2023-07-14 PROCEDURE — G0009 PNEUMOCOCCAL CONJUGATE VACCINE 20-VALENT: ICD-10-PCS | Mod: S$GLB,,, | Performed by: PHYSICIAN ASSISTANT

## 2023-07-14 PROCEDURE — 4010F PR ACE/ARB THEARPY RXD/TAKEN: ICD-10-PCS | Mod: CPTII,S$GLB,, | Performed by: PHYSICIAN ASSISTANT

## 2023-07-14 PROCEDURE — 99214 OFFICE O/P EST MOD 30 MIN: CPT | Mod: S$GLB,,, | Performed by: PHYSICIAN ASSISTANT

## 2023-07-14 PROCEDURE — 3008F BODY MASS INDEX DOCD: CPT | Mod: CPTII,S$GLB,, | Performed by: PHYSICIAN ASSISTANT

## 2023-07-14 PROCEDURE — 3077F SYST BP >= 140 MM HG: CPT | Mod: CPTII,S$GLB,, | Performed by: PHYSICIAN ASSISTANT

## 2023-07-14 PROCEDURE — G0009 ADMIN PNEUMOCOCCAL VACCINE: HCPCS | Mod: S$GLB,,, | Performed by: PHYSICIAN ASSISTANT

## 2023-07-14 PROCEDURE — 3079F PR MOST RECENT DIASTOLIC BLOOD PRESSURE 80-89 MM HG: ICD-10-PCS | Mod: CPTII,S$GLB,, | Performed by: PHYSICIAN ASSISTANT

## 2023-07-14 PROCEDURE — 99999 PR PBB SHADOW E&M-EST. PATIENT-LVL III: CPT | Mod: PBBFAC,,, | Performed by: PHYSICIAN ASSISTANT

## 2023-07-14 PROCEDURE — 1159F MED LIST DOCD IN RCRD: CPT | Mod: CPTII,S$GLB,, | Performed by: PHYSICIAN ASSISTANT

## 2023-07-14 PROCEDURE — 3077F PR MOST RECENT SYSTOLIC BLOOD PRESSURE >= 140 MM HG: ICD-10-PCS | Mod: CPTII,S$GLB,, | Performed by: PHYSICIAN ASSISTANT

## 2023-07-14 PROCEDURE — 99999 PR PBB SHADOW E&M-EST. PATIENT-LVL III: ICD-10-PCS | Mod: PBBFAC,,, | Performed by: PHYSICIAN ASSISTANT

## 2023-07-14 PROCEDURE — 3288F PR FALLS RISK ASSESSMENT DOCUMENTED: ICD-10-PCS | Mod: CPTII,S$GLB,, | Performed by: PHYSICIAN ASSISTANT

## 2023-07-14 PROCEDURE — 4010F ACE/ARB THERAPY RXD/TAKEN: CPT | Mod: CPTII,S$GLB,, | Performed by: PHYSICIAN ASSISTANT

## 2023-07-14 PROCEDURE — 99214 PR OFFICE/OUTPT VISIT, EST, LEVL IV, 30-39 MIN: ICD-10-PCS | Mod: S$GLB,,, | Performed by: PHYSICIAN ASSISTANT

## 2023-07-14 PROCEDURE — 3079F DIAST BP 80-89 MM HG: CPT | Mod: CPTII,S$GLB,, | Performed by: PHYSICIAN ASSISTANT

## 2023-07-14 PROCEDURE — 90677 PNEUMOCOCCAL CONJUGATE VACCINE 20-VALENT: ICD-10-PCS | Mod: S$GLB,,, | Performed by: PHYSICIAN ASSISTANT

## 2023-07-14 PROCEDURE — 3008F PR BODY MASS INDEX (BMI) DOCUMENTED: ICD-10-PCS | Mod: CPTII,S$GLB,, | Performed by: PHYSICIAN ASSISTANT

## 2023-07-14 PROCEDURE — 1101F PR PT FALLS ASSESS DOC 0-1 FALLS W/OUT INJ PAST YR: ICD-10-PCS | Mod: CPTII,S$GLB,, | Performed by: PHYSICIAN ASSISTANT

## 2023-07-14 PROCEDURE — 1101F PT FALLS ASSESS-DOCD LE1/YR: CPT | Mod: CPTII,S$GLB,, | Performed by: PHYSICIAN ASSISTANT

## 2023-07-14 PROCEDURE — 1159F PR MEDICATION LIST DOCUMENTED IN MEDICAL RECORD: ICD-10-PCS | Mod: CPTII,S$GLB,, | Performed by: PHYSICIAN ASSISTANT

## 2023-07-14 PROCEDURE — 90677 PCV20 VACCINE IM: CPT | Mod: S$GLB,,, | Performed by: PHYSICIAN ASSISTANT

## 2023-07-14 PROCEDURE — 1160F PR REVIEW ALL MEDS BY PRESCRIBER/CLIN PHARMACIST DOCUMENTED: ICD-10-PCS | Mod: CPTII,S$GLB,, | Performed by: PHYSICIAN ASSISTANT

## 2023-07-14 RX ORDER — DICLOFENAC SODIUM 10 MG/G
2 GEL TOPICAL 4 TIMES DAILY
Qty: 100 G | Refills: 0 | Status: SHIPPED | OUTPATIENT
Start: 2023-07-14 | End: 2023-07-24

## 2023-07-14 RX ORDER — CHOLECALCIFEROL (VITAMIN D3) 25 MCG
1000 TABLET ORAL DAILY
COMMUNITY

## 2023-07-14 NOTE — PROGRESS NOTES
"Subjective:      Patient ID: Adelina Espitia is a 63 y.o. female.    Chief Complaint: Annual Exam, Knee Pain (Right ), Shoulder Pain (Right ), and Hand Pain (Left )    Patient is new to me, being seen today for annual.     HTN- olmesartan 20mg, amlodipine 5mg   BP at home 110/60s, normally 140/60s    Followed by Dr. Swanson, Cardiologist, appt next month     Due for labs     Last visit Dec 2022 with PCP.    Review of Systems   Constitutional:  Negative for chills, diaphoresis and fever.   HENT:  Negative for congestion, rhinorrhea and sore throat.    Respiratory:  Negative for cough, shortness of breath and wheezing.    Gastrointestinal:  Negative for abdominal pain, constipation, diarrhea, nausea and vomiting.   Musculoskeletal:  Positive for arthralgias (R knee and shoulder, L thumb, denies injury, on and off). Negative for joint swelling.   Skin:  Negative for rash.   Neurological:  Negative for dizziness, light-headedness and headaches.     Objective:   BP (!) 162/88   Pulse 70   Temp 96.6 °F (35.9 °C) (Tympanic)   Resp 20   Ht 5' 6" (1.676 m)   Wt 66.1 kg (145 lb 11.6 oz)   LMP  (LMP Unknown)   SpO2 97%   BMI 23.52 kg/m²   Physical Exam  Constitutional:       General: She is not in acute distress.     Appearance: Normal appearance. She is well-developed. She is not ill-appearing.   HENT:      Head: Normocephalic and atraumatic.   Cardiovascular:      Rate and Rhythm: Normal rate and regular rhythm.      Heart sounds: Normal heart sounds. No murmur heard.  Pulmonary:      Effort: Pulmonary effort is normal. No respiratory distress.      Breath sounds: Normal breath sounds. No decreased breath sounds.   Musculoskeletal:      Right shoulder: Normal.      Left hand: No bony tenderness.      Right knee: Crepitus present. No swelling or bony tenderness. Normal range of motion.      Right lower leg: No edema.      Left lower leg: No edema.   Skin:     General: Skin is warm and dry.      Findings: No " rash.   Psychiatric:         Speech: Speech normal.         Behavior: Behavior normal.         Thought Content: Thought content normal.     Assessment:      1. Annual physical exam    2. Primary hypertension    3. Need for vaccination against Streptococcus pneumoniae    4. Arthralgia, unspecified joint       Plan:   Annual physical exam    Primary hypertension  -     CBC Auto Differential; Future; Expected date: 07/14/2023  -     Comprehensive Metabolic Panel; Future; Expected date: 07/14/2023  -     Lipid Panel; Future; Expected date: 07/14/2023    Need for vaccination against Streptococcus pneumoniae  -     (In Office Administered) Pneumococcal Conjugate Vaccine (20 Valent) (IM)    Arthralgia, unspecified joint  -     diclofenac sodium (VOLTAREN) 1 % Gel; Apply 2 g topically 4 (four) times daily. for 10 days  Dispense: 100 g; Refill: 0      Discussed RICE, topical NSAIDs   If arthralgia worsens, consider x-ray and Rheum referral     Discussed needed vaccines     Monitor BP at home, bring cuff and log to 2wk f/u     Fasting labs     6mth f/u PCP

## 2023-07-20 ENCOUNTER — TELEPHONE (OUTPATIENT)
Dept: UROLOGY | Facility: CLINIC | Age: 63
End: 2023-07-20
Payer: COMMERCIAL

## 2023-07-20 NOTE — TELEPHONE ENCOUNTER
Spoke with pt concerning labs results per Saray. Pt scheduled 6 month f/u    ----- Message from Saray Dhillon NP sent at 7/19/2023 11:43 AM CDT -----  I received notification that patient did not read her patient portal message that I sent 7 days ago.  Please call her and inform her that  There is a small amount of blood seen in urinalysis, less than last assessment.  I will contact you with final urine culture results.  Urine culture is negative for infection, urinalysis indicated a small amount of blood seen under the microscope.  You had a negative microhematuria workup in the past year, therefore, we can continue to monitor this small amount of blood.  Please schedule a follow-up appointment in 6 months.

## 2023-07-21 ENCOUNTER — LAB VISIT (OUTPATIENT)
Dept: LAB | Facility: HOSPITAL | Age: 63
End: 2023-07-21
Attending: PHYSICIAN ASSISTANT
Payer: COMMERCIAL

## 2023-07-21 DIAGNOSIS — I10 PRIMARY HYPERTENSION: ICD-10-CM

## 2023-07-21 LAB
ALBUMIN SERPL BCP-MCNC: 4 G/DL (ref 3.5–5.2)
ALP SERPL-CCNC: 35 U/L (ref 55–135)
ALT SERPL W/O P-5'-P-CCNC: 11 U/L (ref 10–44)
ANION GAP SERPL CALC-SCNC: 8 MMOL/L (ref 8–16)
AST SERPL-CCNC: 16 U/L (ref 10–40)
BASOPHILS # BLD AUTO: 0.04 K/UL (ref 0–0.2)
BASOPHILS NFR BLD: 1.2 % (ref 0–1.9)
BILIRUB SERPL-MCNC: 0.8 MG/DL (ref 0.1–1)
BUN SERPL-MCNC: 19 MG/DL (ref 8–23)
CALCIUM SERPL-MCNC: 9 MG/DL (ref 8.7–10.5)
CHLORIDE SERPL-SCNC: 105 MMOL/L (ref 95–110)
CHOLEST SERPL-MCNC: 175 MG/DL (ref 120–199)
CHOLEST/HDLC SERPL: 2.3 {RATIO} (ref 2–5)
CO2 SERPL-SCNC: 27 MMOL/L (ref 23–29)
CREAT SERPL-MCNC: 1 MG/DL (ref 0.5–1.4)
DIFFERENTIAL METHOD: ABNORMAL
EOSINOPHIL # BLD AUTO: 0 K/UL (ref 0–0.5)
EOSINOPHIL NFR BLD: 1.2 % (ref 0–8)
ERYTHROCYTE [DISTWIDTH] IN BLOOD BY AUTOMATED COUNT: 13.6 % (ref 11.5–14.5)
EST. GFR  (NO RACE VARIABLE): >60 ML/MIN/1.73 M^2
GLUCOSE SERPL-MCNC: 77 MG/DL (ref 70–110)
HCT VFR BLD AUTO: 37.5 % (ref 37–48.5)
HDLC SERPL-MCNC: 76 MG/DL (ref 40–75)
HDLC SERPL: 43.4 % (ref 20–50)
HGB BLD-MCNC: 12.3 G/DL (ref 12–16)
IMM GRANULOCYTES # BLD AUTO: 0 K/UL (ref 0–0.04)
IMM GRANULOCYTES NFR BLD AUTO: 0 % (ref 0–0.5)
LDLC SERPL CALC-MCNC: 91 MG/DL (ref 63–159)
LYMPHOCYTES # BLD AUTO: 1.4 K/UL (ref 1–4.8)
LYMPHOCYTES NFR BLD: 40 % (ref 18–48)
MCH RBC QN AUTO: 32.1 PG (ref 27–31)
MCHC RBC AUTO-ENTMCNC: 32.8 G/DL (ref 32–36)
MCV RBC AUTO: 98 FL (ref 82–98)
MONOCYTES # BLD AUTO: 0.4 K/UL (ref 0.3–1)
MONOCYTES NFR BLD: 11.2 % (ref 4–15)
NEUTROPHILS # BLD AUTO: 1.6 K/UL (ref 1.8–7.7)
NEUTROPHILS NFR BLD: 46.4 % (ref 38–73)
NONHDLC SERPL-MCNC: 99 MG/DL
NRBC BLD-RTO: 0 /100 WBC
PLATELET # BLD AUTO: 198 K/UL (ref 150–450)
PLATELET BLD QL SMEAR: ABNORMAL
PMV BLD AUTO: 10.8 FL (ref 9.2–12.9)
POTASSIUM SERPL-SCNC: 4 MMOL/L (ref 3.5–5.1)
PROT SERPL-MCNC: 6.7 G/DL (ref 6–8.4)
RBC # BLD AUTO: 3.83 M/UL (ref 4–5.4)
SODIUM SERPL-SCNC: 140 MMOL/L (ref 136–145)
TRIGL SERPL-MCNC: 40 MG/DL (ref 30–150)
WBC # BLD AUTO: 3.4 K/UL (ref 3.9–12.7)

## 2023-07-21 PROCEDURE — 85025 COMPLETE CBC W/AUTO DIFF WBC: CPT | Performed by: PHYSICIAN ASSISTANT

## 2023-07-21 PROCEDURE — 36415 COLL VENOUS BLD VENIPUNCTURE: CPT | Performed by: PHYSICIAN ASSISTANT

## 2023-07-21 PROCEDURE — 80053 COMPREHEN METABOLIC PANEL: CPT | Performed by: PHYSICIAN ASSISTANT

## 2023-07-21 PROCEDURE — 80061 LIPID PANEL: CPT | Performed by: PHYSICIAN ASSISTANT

## 2023-07-28 ENCOUNTER — OFFICE VISIT (OUTPATIENT)
Dept: INTERNAL MEDICINE | Facility: CLINIC | Age: 63
End: 2023-07-28
Payer: MEDICARE

## 2023-07-28 VITALS
OXYGEN SATURATION: 98 % | HEART RATE: 66 BPM | DIASTOLIC BLOOD PRESSURE: 50 MMHG | HEIGHT: 66 IN | BODY MASS INDEX: 22.99 KG/M2 | TEMPERATURE: 97 F | SYSTOLIC BLOOD PRESSURE: 100 MMHG | WEIGHT: 143.06 LBS | RESPIRATION RATE: 17 BRPM

## 2023-07-28 DIAGNOSIS — I10 PRIMARY HYPERTENSION: Primary | ICD-10-CM

## 2023-07-28 DIAGNOSIS — T22.112A SUPERFICIAL BURN OF LEFT FOREARM, INITIAL ENCOUNTER: ICD-10-CM

## 2023-07-28 PROCEDURE — 1160F PR REVIEW ALL MEDS BY PRESCRIBER/CLIN PHARMACIST DOCUMENTED: ICD-10-PCS | Mod: CPTII,S$GLB,, | Performed by: PHYSICIAN ASSISTANT

## 2023-07-28 PROCEDURE — 1159F PR MEDICATION LIST DOCUMENTED IN MEDICAL RECORD: ICD-10-PCS | Mod: CPTII,S$GLB,, | Performed by: PHYSICIAN ASSISTANT

## 2023-07-28 PROCEDURE — 3074F PR MOST RECENT SYSTOLIC BLOOD PRESSURE < 130 MM HG: ICD-10-PCS | Mod: CPTII,S$GLB,, | Performed by: PHYSICIAN ASSISTANT

## 2023-07-28 PROCEDURE — 1160F RVW MEDS BY RX/DR IN RCRD: CPT | Mod: CPTII,S$GLB,, | Performed by: PHYSICIAN ASSISTANT

## 2023-07-28 PROCEDURE — 3078F DIAST BP <80 MM HG: CPT | Mod: CPTII,S$GLB,, | Performed by: PHYSICIAN ASSISTANT

## 2023-07-28 PROCEDURE — 4010F ACE/ARB THERAPY RXD/TAKEN: CPT | Mod: CPTII,S$GLB,, | Performed by: PHYSICIAN ASSISTANT

## 2023-07-28 PROCEDURE — 3288F PR FALLS RISK ASSESSMENT DOCUMENTED: ICD-10-PCS | Mod: CPTII,S$GLB,, | Performed by: PHYSICIAN ASSISTANT

## 2023-07-28 PROCEDURE — 4010F PR ACE/ARB THEARPY RXD/TAKEN: ICD-10-PCS | Mod: CPTII,S$GLB,, | Performed by: PHYSICIAN ASSISTANT

## 2023-07-28 PROCEDURE — 1159F MED LIST DOCD IN RCRD: CPT | Mod: CPTII,S$GLB,, | Performed by: PHYSICIAN ASSISTANT

## 2023-07-28 PROCEDURE — 1101F PT FALLS ASSESS-DOCD LE1/YR: CPT | Mod: CPTII,S$GLB,, | Performed by: PHYSICIAN ASSISTANT

## 2023-07-28 PROCEDURE — 3074F SYST BP LT 130 MM HG: CPT | Mod: CPTII,S$GLB,, | Performed by: PHYSICIAN ASSISTANT

## 2023-07-28 PROCEDURE — 99214 OFFICE O/P EST MOD 30 MIN: CPT | Mod: S$GLB,,, | Performed by: PHYSICIAN ASSISTANT

## 2023-07-28 PROCEDURE — 99999 PR PBB SHADOW E&M-EST. PATIENT-LVL V: CPT | Mod: PBBFAC,,, | Performed by: PHYSICIAN ASSISTANT

## 2023-07-28 PROCEDURE — 99214 PR OFFICE/OUTPT VISIT, EST, LEVL IV, 30-39 MIN: ICD-10-PCS | Mod: S$GLB,,, | Performed by: PHYSICIAN ASSISTANT

## 2023-07-28 PROCEDURE — 3008F BODY MASS INDEX DOCD: CPT | Mod: CPTII,S$GLB,, | Performed by: PHYSICIAN ASSISTANT

## 2023-07-28 PROCEDURE — 3008F PR BODY MASS INDEX (BMI) DOCUMENTED: ICD-10-PCS | Mod: CPTII,S$GLB,, | Performed by: PHYSICIAN ASSISTANT

## 2023-07-28 PROCEDURE — 3288F FALL RISK ASSESSMENT DOCD: CPT | Mod: CPTII,S$GLB,, | Performed by: PHYSICIAN ASSISTANT

## 2023-07-28 PROCEDURE — 99999 PR PBB SHADOW E&M-EST. PATIENT-LVL V: ICD-10-PCS | Mod: PBBFAC,,, | Performed by: PHYSICIAN ASSISTANT

## 2023-07-28 PROCEDURE — 3078F PR MOST RECENT DIASTOLIC BLOOD PRESSURE < 80 MM HG: ICD-10-PCS | Mod: CPTII,S$GLB,, | Performed by: PHYSICIAN ASSISTANT

## 2023-07-28 PROCEDURE — 1101F PR PT FALLS ASSESS DOC 0-1 FALLS W/OUT INJ PAST YR: ICD-10-PCS | Mod: CPTII,S$GLB,, | Performed by: PHYSICIAN ASSISTANT

## 2023-07-28 RX ORDER — SILVER SULFADIAZINE 10 G/1000G
CREAM TOPICAL DAILY
Qty: 25 G | Refills: 0 | Status: SHIPPED | OUTPATIENT
Start: 2023-07-28

## 2023-07-28 NOTE — PROGRESS NOTES
"Subjective:      Patient ID: Adelina Espitia is a 63 y.o. female.    Chief Complaint: 2 Week Follow-Up (HTN) and Burn (On Right Forearm)    Patient is known to me, being seen today for BP f/u.     HTN- olmesartan 20mg, amlodipine 5mg   BP at home 100-110s/40-50s, brought home cuff today which is fairly accurate   Reports BP is always high at the doctor, h/o white coat HTN     Burned L arm yesterday on hot water, with blister to L forearm   UTD tetanus 2017   Used a neosporin     Last visit July 2023 with myself.    Review of Systems   Constitutional:  Negative for chills, diaphoresis and fever.   HENT:  Negative for congestion, rhinorrhea and sore throat.    Respiratory:  Negative for cough, shortness of breath and wheezing.    Cardiovascular:  Negative for chest pain and leg swelling.   Gastrointestinal:  Negative for abdominal pain, constipation, diarrhea, nausea and vomiting.   Skin:  Positive for wound (burn L arm). Negative for rash.   Neurological:  Negative for dizziness, light-headedness and headaches.     Objective:   BP (!) 100/50 Comment: Recent home reading  Pulse 66   Temp 97.2 °F (36.2 °C)   Resp 17   Ht 5' 6" (1.676 m)   Wt 64.9 kg (143 lb 1.3 oz)   LMP  (LMP Unknown)   SpO2 98%   BMI 23.09 kg/m²   Physical Exam  Constitutional:       General: She is not in acute distress.     Appearance: She is well-developed. She is not ill-appearing or diaphoretic.   HENT:      Head: Normocephalic and atraumatic.      Right Ear: External ear normal.      Left Ear: External ear normal.   Eyes:      General: Lids are normal.         Right eye: No discharge.         Left eye: No discharge.      Conjunctiva/sclera: Conjunctivae normal.      Right eye: Right conjunctiva is not injected.      Left eye: Left conjunctiva is not injected.   Pulmonary:      Effort: Pulmonary effort is normal. No respiratory distress.   Musculoskeletal:        Arms:    Skin:     General: Skin is warm and dry.      Findings: No " rash.   Neurological:      Mental Status: She is alert and oriented to person, place, and time.   Psychiatric:         Speech: Speech normal.         Behavior: Behavior normal.         Thought Content: Thought content normal.         Judgment: Judgment normal.     Assessment:      1. Primary hypertension    2. Superficial burn of left forearm, initial encounter       Plan:   Primary hypertension  -     CBC Auto Differential; Future; Expected date: 07/28/2023  -     Lipid Panel; Future; Expected date: 07/28/2023  -     Comprehensive Metabolic Panel; Future; Expected date: 07/28/2023    Superficial burn of left forearm, initial encounter  -     silver sulfADIAZINE 1% (SILVADENE) 1 % cream; Apply topically once daily.  Dispense: 25 g; Refill: 0      Hold olmesartan, monitor BP at home, if elevated we may restart at lower dose     Keep Card appt next month     Advise cool compresses, otc pain relievers for burn, discussed s/s infection    Discussed worsening signs/symptoms and when to return to clinic or go to ED.   Patient expresses understanding and agrees with treatment plan.

## 2023-10-06 PROBLEM — I77.819 AORTIC ECTASIA: Status: ACTIVE | Noted: 2023-10-06

## 2023-10-06 PROBLEM — J84.10 CALCIFIED GRANULOMA OF LUNG: Status: ACTIVE | Noted: 2023-10-06

## 2023-10-06 PROBLEM — J98.4 CALCIFIED GRANULOMA OF LUNG: Status: ACTIVE | Noted: 2023-10-06

## 2023-10-10 ENCOUNTER — OFFICE VISIT (OUTPATIENT)
Dept: INTERNAL MEDICINE | Facility: CLINIC | Age: 63
End: 2023-10-10
Payer: COMMERCIAL

## 2023-10-10 VITALS
SYSTOLIC BLOOD PRESSURE: 174 MMHG | DIASTOLIC BLOOD PRESSURE: 98 MMHG | HEART RATE: 53 BPM | HEIGHT: 66 IN | BODY MASS INDEX: 22.75 KG/M2 | WEIGHT: 141.56 LBS | OXYGEN SATURATION: 98 %

## 2023-10-10 DIAGNOSIS — I10 PRIMARY HYPERTENSION: ICD-10-CM

## 2023-10-10 DIAGNOSIS — I77.819 AORTIC ECTASIA: ICD-10-CM

## 2023-10-10 DIAGNOSIS — N28.1 RENAL CYST: ICD-10-CM

## 2023-10-10 DIAGNOSIS — Z00.00 ENCOUNTER FOR PREVENTIVE HEALTH EXAMINATION: Primary | ICD-10-CM

## 2023-10-10 DIAGNOSIS — J84.10 CALCIFIED GRANULOMA OF LUNG: ICD-10-CM

## 2023-10-10 PROCEDURE — 3288F FALL RISK ASSESSMENT DOCD: CPT | Mod: CPTII,S$GLB,, | Performed by: NURSE PRACTITIONER

## 2023-10-10 PROCEDURE — 99999 PR PBB SHADOW E&M-EST. PATIENT-LVL V: CPT | Mod: PBBFAC,,, | Performed by: NURSE PRACTITIONER

## 2023-10-10 PROCEDURE — 1101F PR PT FALLS ASSESS DOC 0-1 FALLS W/OUT INJ PAST YR: ICD-10-PCS | Mod: CPTII,S$GLB,, | Performed by: NURSE PRACTITIONER

## 2023-10-10 PROCEDURE — 1160F PR REVIEW ALL MEDS BY PRESCRIBER/CLIN PHARMACIST DOCUMENTED: ICD-10-PCS | Mod: CPTII,S$GLB,, | Performed by: NURSE PRACTITIONER

## 2023-10-10 PROCEDURE — 99999 PR PBB SHADOW E&M-EST. PATIENT-LVL V: ICD-10-PCS | Mod: PBBFAC,,, | Performed by: NURSE PRACTITIONER

## 2023-10-10 PROCEDURE — 3077F SYST BP >= 140 MM HG: CPT | Mod: CPTII,S$GLB,, | Performed by: NURSE PRACTITIONER

## 2023-10-10 PROCEDURE — 1101F PT FALLS ASSESS-DOCD LE1/YR: CPT | Mod: CPTII,S$GLB,, | Performed by: NURSE PRACTITIONER

## 2023-10-10 PROCEDURE — G0402 PR WELCOME MEDICARE PREVENTIVE VISIT NEW ENROLLEE: ICD-10-PCS | Mod: S$GLB,,, | Performed by: NURSE PRACTITIONER

## 2023-10-10 PROCEDURE — 3288F PR FALLS RISK ASSESSMENT DOCUMENTED: ICD-10-PCS | Mod: CPTII,S$GLB,, | Performed by: NURSE PRACTITIONER

## 2023-10-10 PROCEDURE — 1159F PR MEDICATION LIST DOCUMENTED IN MEDICAL RECORD: ICD-10-PCS | Mod: CPTII,S$GLB,, | Performed by: NURSE PRACTITIONER

## 2023-10-10 PROCEDURE — 3080F DIAST BP >= 90 MM HG: CPT | Mod: CPTII,S$GLB,, | Performed by: NURSE PRACTITIONER

## 2023-10-10 PROCEDURE — 1159F MED LIST DOCD IN RCRD: CPT | Mod: CPTII,S$GLB,, | Performed by: NURSE PRACTITIONER

## 2023-10-10 PROCEDURE — 4010F PR ACE/ARB THEARPY RXD/TAKEN: ICD-10-PCS | Mod: CPTII,S$GLB,, | Performed by: NURSE PRACTITIONER

## 2023-10-10 PROCEDURE — 3077F PR MOST RECENT SYSTOLIC BLOOD PRESSURE >= 140 MM HG: ICD-10-PCS | Mod: CPTII,S$GLB,, | Performed by: NURSE PRACTITIONER

## 2023-10-10 PROCEDURE — 1160F RVW MEDS BY RX/DR IN RCRD: CPT | Mod: CPTII,S$GLB,, | Performed by: NURSE PRACTITIONER

## 2023-10-10 PROCEDURE — 3080F PR MOST RECENT DIASTOLIC BLOOD PRESSURE >= 90 MM HG: ICD-10-PCS | Mod: CPTII,S$GLB,, | Performed by: NURSE PRACTITIONER

## 2023-10-10 PROCEDURE — 4010F ACE/ARB THERAPY RXD/TAKEN: CPT | Mod: CPTII,S$GLB,, | Performed by: NURSE PRACTITIONER

## 2023-10-10 PROCEDURE — G0402 INITIAL PREVENTIVE EXAM: HCPCS | Mod: S$GLB,,, | Performed by: NURSE PRACTITIONER

## 2023-10-10 NOTE — PROGRESS NOTES
"  Adelina Espitia presented for a  Medicare AWV and comprehensive Health Risk Assessment today. The following components were reviewed and updated:    Medical history  Family History  Social history  Allergies and Current Medications  Health Risk Assessment  Health Maintenance  Care Team         ** See Completed Assessments for Annual Wellness Visit within the encounter summary.**         The following assessments were completed:  Living Situation  CAGE  Depression Screening  Timed Get Up and Go  Whisper Test  Cognitive Function Screening  Nutrition Screening  ADL Screening  PAQ Screening        Vitals:    10/10/23 1016 10/10/23 1041   BP:  (!) 174/98   Pulse: (!) 53    SpO2: 98%    Weight: 64.2 kg (141 lb 8.6 oz)    Height: 5' 6" (1.676 m)      Body mass index is 22.84 kg/m².  Physical Exam  Vitals and nursing note reviewed.   Constitutional:       Appearance: She is well-developed.   HENT:      Head: Normocephalic.   Cardiovascular:      Rate and Rhythm: Normal rate and regular rhythm.      Heart sounds: Normal heart sounds.   Pulmonary:      Effort: Pulmonary effort is normal. No respiratory distress.      Breath sounds: Normal breath sounds.   Abdominal:      Palpations: Abdomen is soft. There is no mass.      Tenderness: There is no abdominal tenderness.   Musculoskeletal:         General: Normal range of motion.   Skin:     General: Skin is warm and dry.   Neurological:      Mental Status: She is alert and oriented to person, place, and time.      Motor: No abnormal muscle tone.   Psychiatric:         Speech: Speech normal.         Behavior: Behavior normal.               Diagnoses and health risks identified today and associated recommendations/orders:    1. Encounter for preventive health examination     Review for Opioid Screening: Pt does not have Rx for Opioids      Review for Substance Use Disorders: Patient does not use substance  per chart   Encouraged healthy diet and exercise as tolerated  Discussed " receiving Shingrix at pharmacy.    Reports stress but is not problematic at this time. Patient knows to follow up with PCP if becomes problematic.      2. Aortic ectasia  Cxr 4/16  Discussed diagnosis and risk reduction.   Advised to follow up with PCP for further recommendations. Patient expressed understanding.       3. Calcified granuloma of lung  Ct 7/22  Advised to follow up with PCP for further evaluation and recommendations. Patient expressed understanding.      4. Primary hypertension  BP elevated at today's visit. Reports feels fine. Reports BP is usually elevated in office. Reports home readings 120s/50s (reports machine has been calibrated). Discussed s/s of MI and stroke (patient denies any s/s) and advised to go to the ER/911 if occur. Advised patient to monitor BP (keep a log) and if continues to stay elevated (greater than 140/90) to follow up with PCP for further evaluation and treatment. She will recheck once home and notify PCP/cardiologist if not trending down. She expressed understanding.       5. Renal cyst  Continue current treatment plan as previously prescribed with your  urologist.       Provided Adelina with a 5-10 year written screening schedule and personal prevention plan. Recommendations were developed using the USPSTF age appropriate recommendations. Education, counseling, and referrals were provided as needed. After Visit Summary printed and given to patient which includes a list of additional screenings\tests needed.    Follow up in about 1 year (around 10/10/2024) for awv.    Minna Lauren NP  I offered to discuss advanced care planning, including how to pick a person who would make decisions for you if you were unable to make them for yourself, called a health care power of , and what kind of decisions you might make such as use of life sustaining treatments such as ventilators and tube feeding when faced with a life limiting illness recorded on a living will that they  will need to know. (How you want to be cared for as you near the end of your natural life)     X Patient is interested in learning more about how to make advanced directives.  I provided them paperwork and offered to discuss this with them.

## 2023-10-10 NOTE — PATIENT INSTRUCTIONS
Counseling and Referral of Other Preventative  (Italic type indicates deductible and co-insurance are waived)    Patient Name: Adelina Espitia  Today's Date: 10/10/2023    Health Maintenance       Date Due Completion Date    Shingles Vaccine (1 of 2) Never done ---    COVID-19 Vaccine (3 - 2023-24 season) 09/01/2023 4/6/2021    HIV Screening 05/12/2028 (Originally 3/28/1975) ---    Lipid Panel 07/21/2024 7/21/2023    Override on 3/27/2017: Done (see media -Dr Miguel A Swanson)    Override on 2/2/2016: Not Clinically Appropriate (sees cards)    DEXA Scan 02/01/2025 2/1/2022    Colorectal Cancer Screening 06/08/2026 6/8/2016    Override on 6/8/2016: Done (DR ALEX CASE/[facility] DIVERTICULOSIS ASCENDING, SIGMOID & DESCENDING COLON. REPEAT 5-10YRS)    Override on 1/1/2007: Done (Dr Case)    TETANUS VACCINE 09/01/2027 9/1/2017        No orders of the defined types were placed in this encounter.    The following information is provided to all patients.  This information is to help you find resources for any of the problems found today that may be affecting your health:                Living healthy guide: www.[facility].[place].gov      Understanding Diabetes: www.diabetes.org      Eating healthy: www.cdc.gov/healthyweight      CDC home safety checklist: www.cdc.gov/steadi/patient.html      Agency on Aging: www.goea.[place].gov      Alcoholics anonymous (AA): www.aa.org      Physical Activity: www.bishop.nih.gov/hx3wmbp      Tobacco use: www.quitwithusla.org

## 2024-01-17 ENCOUNTER — LAB VISIT (OUTPATIENT)
Dept: LAB | Facility: HOSPITAL | Age: 64
End: 2024-01-17
Attending: FAMILY MEDICINE
Payer: COMMERCIAL

## 2024-01-17 ENCOUNTER — OFFICE VISIT (OUTPATIENT)
Dept: INTERNAL MEDICINE | Facility: CLINIC | Age: 64
End: 2024-01-17
Payer: MEDICARE

## 2024-01-17 VITALS
OXYGEN SATURATION: 100 % | BODY MASS INDEX: 23.74 KG/M2 | SYSTOLIC BLOOD PRESSURE: 120 MMHG | TEMPERATURE: 98 F | WEIGHT: 147.69 LBS | DIASTOLIC BLOOD PRESSURE: 70 MMHG | HEIGHT: 66 IN | HEART RATE: 66 BPM

## 2024-01-17 DIAGNOSIS — R82.81 PYURIA: ICD-10-CM

## 2024-01-17 DIAGNOSIS — I77.819 AORTIC ECTASIA: ICD-10-CM

## 2024-01-17 DIAGNOSIS — I10 PRIMARY HYPERTENSION: ICD-10-CM

## 2024-01-17 DIAGNOSIS — Z23 NEED FOR VACCINATION AGAINST STREPTOCOCCUS PNEUMONIAE: ICD-10-CM

## 2024-01-17 DIAGNOSIS — E53.8 B12 DEFICIENCY: ICD-10-CM

## 2024-01-17 DIAGNOSIS — E55.9 VITAMIN D DEFICIENCY DISEASE: ICD-10-CM

## 2024-01-17 DIAGNOSIS — J84.10 CALCIFIED GRANULOMA OF LUNG: ICD-10-CM

## 2024-01-17 DIAGNOSIS — D70.9 NEUTROPENIA, UNSPECIFIED TYPE: Primary | ICD-10-CM

## 2024-01-17 LAB
BACTERIA #/AREA URNS AUTO: ABNORMAL /HPF
BILIRUB UR QL STRIP: NEGATIVE
CLARITY UR REFRACT.AUTO: ABNORMAL
COLOR UR AUTO: YELLOW
GLUCOSE UR QL STRIP: NEGATIVE
HGB UR QL STRIP: ABNORMAL
KETONES UR QL STRIP: NEGATIVE
LEUKOCYTE ESTERASE UR QL STRIP: NEGATIVE
MICROSCOPIC COMMENT: ABNORMAL
NITRITE UR QL STRIP: NEGATIVE
PH UR STRIP: 5 [PH] (ref 5–8)
PROT UR QL STRIP: NEGATIVE
RBC #/AREA URNS AUTO: 3 /HPF (ref 0–4)
SP GR UR STRIP: 1.02 (ref 1–1.03)
SQUAMOUS #/AREA URNS AUTO: 8 /HPF
URN SPEC COLLECT METH UR: ABNORMAL
WBC #/AREA URNS AUTO: 3 /HPF (ref 0–5)

## 2024-01-17 PROCEDURE — 99999 PR PBB SHADOW E&M-EST. PATIENT-LVL IV: CPT | Mod: PBBFAC,,, | Performed by: FAMILY MEDICINE

## 2024-01-17 PROCEDURE — 99214 OFFICE O/P EST MOD 30 MIN: CPT | Mod: S$GLB,,, | Performed by: FAMILY MEDICINE

## 2024-01-17 PROCEDURE — 81001 URINALYSIS AUTO W/SCOPE: CPT | Performed by: FAMILY MEDICINE

## 2024-01-17 NOTE — PROGRESS NOTES
Subjective:       Patient ID: Adelina Espitia is a 63 y.o. female.    Chief Complaint: Follow-up    She is followed by Cardiology and hepatology.  Medical history includes pulmonary granuloma hypertension esophageal reflux aortic ectasia vitamin-D deficiency B12 deficiency polyuria.  She denies chest pain shortness and breath edema.  She has occasional palpitations.  She reports up-to-date stress test was normal.  She reports no current reflux or dysphagia.    Follow-up  Pertinent negatives include no abdominal pain, chest pain, chills, congestion, coughing, fatigue, fever, headaches, nausea, vomiting or weakness.     Review of Systems   Constitutional:  Negative for activity change, appetite change, chills, fatigue, fever and unexpected weight change.   HENT:  Negative for congestion.    Respiratory:  Negative for cough, chest tightness, shortness of breath and wheezing.    Cardiovascular:  Positive for palpitations. Negative for chest pain and leg swelling.   Gastrointestinal:  Negative for abdominal distention, abdominal pain, nausea and vomiting.   Genitourinary:  Positive for frequency. Negative for dysuria, hematuria and urgency.   Neurological:  Negative for dizziness, weakness, light-headedness and headaches.       Objective:      Physical Exam  Constitutional:       General: She is not in acute distress.     Appearance: She is not ill-appearing or diaphoretic.   HENT:      Nose: Nose normal.   Eyes:      Conjunctiva/sclera: Conjunctivae normal.   Neck:      Comments: Normal thyroid 2+ carotids  Cardiovascular:      Rate and Rhythm: Normal rate and regular rhythm.      Heart sounds: No murmur heard.     No gallop.   Pulmonary:      Effort: Pulmonary effort is normal. No respiratory distress.      Breath sounds: No wheezing, rhonchi or rales.   Abdominal:      General: There is no distension.      Palpations: Abdomen is soft. There is no mass.      Tenderness: There is no abdominal tenderness.    Lymphadenopathy:      Cervical: No cervical adenopathy.   Skin:     General: Skin is warm and dry.      Findings: No bruising.   Neurological:      Mental Status: She is alert.   Psychiatric:         Mood and Affect: Mood normal.         Behavior: Behavior normal.         Lab Visit on 07/21/2023   Component Date Value Ref Range Status    WBC 07/21/2023 3.40 (L)  3.90 - 12.70 K/uL Final    RBC 07/21/2023 3.83 (L)  4.00 - 5.40 M/uL Final    Hemoglobin 07/21/2023 12.3  12.0 - 16.0 g/dL Final    Hematocrit 07/21/2023 37.5  37.0 - 48.5 % Final    MCV 07/21/2023 98  82 - 98 fL Final    MCH 07/21/2023 32.1 (H)  27.0 - 31.0 pg Final    MCHC 07/21/2023 32.8  32.0 - 36.0 g/dL Final    RDW 07/21/2023 13.6  11.5 - 14.5 % Final    Platelets 07/21/2023 198  150 - 450 K/uL Final    MPV 07/21/2023 10.8  9.2 - 12.9 fL Final    Immature Granulocytes 07/21/2023 0.0  0.0 - 0.5 % Final    Gran # (ANC) 07/21/2023 1.6 (L)  1.8 - 7.7 K/uL Final    Immature Grans (Abs) 07/21/2023 0.00  0.00 - 0.04 K/uL Final    Lymph # 07/21/2023 1.4  1.0 - 4.8 K/uL Final    Mono # 07/21/2023 0.4  0.3 - 1.0 K/uL Final    Eos # 07/21/2023 0.0  0.0 - 0.5 K/uL Final    Baso # 07/21/2023 0.04  0.00 - 0.20 K/uL Final    nRBC 07/21/2023 0  0 /100 WBC Final    Gran % 07/21/2023 46.4  38.0 - 73.0 % Final    Lymph % 07/21/2023 40.0  18.0 - 48.0 % Final    Mono % 07/21/2023 11.2  4.0 - 15.0 % Final    Eosinophil % 07/21/2023 1.2  0.0 - 8.0 % Final    Basophil % 07/21/2023 1.2  0.0 - 1.9 % Final    Platelet Estimate 07/21/2023 Appears normal   Final    Differential Method 07/21/2023 Automated   Final    Sodium 07/21/2023 140  136 - 145 mmol/L Final    Potassium 07/21/2023 4.0  3.5 - 5.1 mmol/L Final    Chloride 07/21/2023 105  95 - 110 mmol/L Final    CO2 07/21/2023 27  23 - 29 mmol/L Final    Glucose 07/21/2023 77  70 - 110 mg/dL Final    BUN 07/21/2023 19  8 - 23 mg/dL Final    Creatinine 07/21/2023 1.0  0.5 - 1.4 mg/dL Final    Calcium 07/21/2023 9.0  8.7 - 10.5  mg/dL Final    Total Protein 07/21/2023 6.7  6.0 - 8.4 g/dL Final    Albumin 07/21/2023 4.0  3.5 - 5.2 g/dL Final    Total Bilirubin 07/21/2023 0.8  0.1 - 1.0 mg/dL Final    Alkaline Phosphatase 07/21/2023 35 (L)  55 - 135 U/L Final    AST 07/21/2023 16  10 - 40 U/L Final    ALT 07/21/2023 11  10 - 44 U/L Final    eGFR 07/21/2023 >60.0  >60 mL/min/1.73 m^2 Final    Anion Gap 07/21/2023 8  8 - 16 mmol/L Final    Cholesterol 07/21/2023 175  120 - 199 mg/dL Final    Triglycerides 07/21/2023 40  30 - 150 mg/dL Final    HDL 07/21/2023 76 (H)  40 - 75 mg/dL Final    LDL Cholesterol 07/21/2023 91.0  63.0 - 159.0 mg/dL Final    HDL/Cholesterol Ratio 07/21/2023 43.4  20.0 - 50.0 % Final    Total Cholesterol/HDL Ratio 07/21/2023 2.3  2.0 - 5.0 Final    Non-HDL Cholesterol 07/21/2023 99  mg/dL Final     Assessment:       1. Neutropenia, unspecified type    2. Calcified granuloma of lung    3. Aortic ectasia    4. Pyuria    5. Vitamin D deficiency disease    6. B12 deficiency    7. Need for vaccination against Streptococcus pneumoniae    8. Primary hypertension        Plan:   Lab was ordered regards neutropenia.  Recheck urinalysis pyuria.  She is on vitamin-D B12 supplements and this has been rechecked.  Calcified granulomas longer stable.  Aortic ectasia is being treated medically.  Follow-up 1 year.  Health maintenance reviewed.  RSV Shingrix ordered.  She will get COVID booster her pharmacy.  Blood pressure controlled      Neutropenia, unspecified type  -     CBC Auto Differential; Future; Expected date: 01/17/2024    Calcified granuloma of lung    Aortic ectasia    Pyuria  -     Urinalysis; Future; Expected date: 01/17/2024    Vitamin D deficiency disease  -     Vitamin D; Future; Expected date: 01/17/2024    B12 deficiency  -     Vitamin B12; Future; Expected date: 01/17/2024    Need for vaccination against Streptococcus pneumoniae    Primary hypertension

## 2024-01-22 ENCOUNTER — OFFICE VISIT (OUTPATIENT)
Dept: UROLOGY | Facility: CLINIC | Age: 64
End: 2024-01-22
Payer: MEDICARE

## 2024-01-22 VITALS
HEART RATE: 64 BPM | SYSTOLIC BLOOD PRESSURE: 162 MMHG | WEIGHT: 148.56 LBS | HEIGHT: 66 IN | TEMPERATURE: 97 F | DIASTOLIC BLOOD PRESSURE: 75 MMHG | BODY MASS INDEX: 23.88 KG/M2 | RESPIRATION RATE: 18 BRPM

## 2024-01-22 DIAGNOSIS — R31.29 MICROHEMATURIA: Primary | ICD-10-CM

## 2024-01-22 DIAGNOSIS — N28.1 RENAL CYST: ICD-10-CM

## 2024-01-22 LAB
BACTERIA #/AREA URNS AUTO: NORMAL /HPF
BILIRUB UR QL STRIP: NEGATIVE
BILIRUB UR QL STRIP: NEGATIVE
CLARITY UR REFRACT.AUTO: CLEAR
COLOR UR AUTO: YELLOW
GLUCOSE UR QL STRIP: NEGATIVE
GLUCOSE UR QL STRIP: NEGATIVE
HGB UR QL STRIP: ABNORMAL
KETONES UR QL STRIP: NEGATIVE
KETONES UR QL STRIP: NEGATIVE
LEUKOCYTE ESTERASE UR QL STRIP: NEGATIVE
LEUKOCYTE ESTERASE UR QL STRIP: NEGATIVE
MICROSCOPIC COMMENT: NORMAL
NITRITE UR QL STRIP: NEGATIVE
PH UR STRIP: 6 [PH] (ref 5–8)
PH, POC UA: 6
POC BLOOD, URINE: POSITIVE
POC NITRATES, URINE: NEGATIVE
PROT UR QL STRIP: NEGATIVE
PROT UR QL STRIP: NEGATIVE
RBC #/AREA URNS AUTO: 3 /HPF (ref 0–4)
SP GR UR STRIP: 1.01 (ref 1–1.03)
SP GR UR STRIP: 1.02 (ref 1–1.03)
SQUAMOUS #/AREA URNS AUTO: 5 /HPF
UNSPECIFIED CRY UR QL COMP ASSIST: <1
URN SPEC COLLECT METH UR: ABNORMAL
UROBILINOGEN UR STRIP-ACNC: 0.2 (ref 0.1–1.1)
WBC #/AREA URNS AUTO: 0 /HPF (ref 0–5)

## 2024-01-22 PROCEDURE — 81003 URINALYSIS AUTO W/O SCOPE: CPT | Mod: QW,S$GLB,, | Performed by: NURSE PRACTITIONER

## 2024-01-22 PROCEDURE — 88112 CYTOPATH CELL ENHANCE TECH: CPT | Mod: 26,,, | Performed by: PATHOLOGY

## 2024-01-22 PROCEDURE — 99999 PR PBB SHADOW E&M-EST. PATIENT-LVL IV: CPT | Mod: PBBFAC,,, | Performed by: NURSE PRACTITIONER

## 2024-01-22 PROCEDURE — 99214 OFFICE O/P EST MOD 30 MIN: CPT | Mod: S$GLB,,, | Performed by: NURSE PRACTITIONER

## 2024-01-22 PROCEDURE — 87086 URINE CULTURE/COLONY COUNT: CPT | Performed by: NURSE PRACTITIONER

## 2024-01-22 PROCEDURE — 88112 CYTOPATH CELL ENHANCE TECH: CPT | Performed by: PATHOLOGY

## 2024-01-22 PROCEDURE — 81001 URINALYSIS AUTO W/SCOPE: CPT | Performed by: NURSE PRACTITIONER

## 2024-01-22 NOTE — PATIENT INSTRUCTIONS
Renal cyst  Make sure we have a follow-up renal ultrasound in 6 months, with a follow-up visit.     Microhematuria  Urine sent for U/A , culture and cytology

## 2024-01-22 NOTE — PROGRESS NOTES
Chief Complaint:   Microhematuria  Renal cysts    HPI:   Patient is a 63-year-old female that is presenting as a follow-up to renal cyst and microhematuria.  Denies gross hematuria, pelvic or flank pain.  Urine in clinic indicated small blood, all other parameters are negative.  His had a negative microhematuria workup in the last 12 months.  Recent CT scan renal mass protocol indicated a Bosniak 2, nonreassuring renal cyst.  07/11/2023  Patient is a 63-year-old female that is presenting as a follow-up to micro hematuria and renal cyst.Patient denies gross hematuria.  Urine in clinic indicates trace leukocytes, all other parameters negative.  Patient had a negative microhematuria workup,07/2022.  Renal ultrasound noted a complex cyst, CT renal mass protocol indicated a Bosniak 2, nonenhancing renal cyst.  Allergies:  Patient has no known allergies.    Medications:  has a current medication list which includes the following prescription(s): amlodipine, b complex vitamins, biotin, cyanocobalamin (vitamin b-12), dorzolamide-timolol 2-0.5%, estradiol, olmesartan, arexvy (pf), silver sulfadiazine 1%, vitamin d, and diclofenac sodium.    Review of Systems:  General: No fever, chills, fatigability, or weight loss.  Skin: No rashes, itching, or changes in color or texture of skin.  Chest: Denies MEJIA, cyanosis, wheezing, cough, and sputum production.  Abdomen: Appetite fine. No weight loss. Denies diarrhea, abdominal pain, hematemesis, or blood in stool.  Musculoskeletal: No joint stiffness or swelling. Denies back pain.  : As above.  All other review of systems negative.    PMH:   has a past medical history of GERD with stricture, Gout, Hormone disorder, and Hypertension.    PSH:   has a past surgical history that includes Hysterectomy (1998); cyst removal hand (Left); Knee arthroscopy (Right, 05/03/2016); Colonoscopy (06/08/2016); and Esophageal dilation.    FamHx: family history includes Diabetes in her brother; Heart  disease in her sister; Hypertension in her father; Multiple sclerosis in her daughter.    SocHx:  reports that she has never smoked. She has never used smokeless tobacco. She reports that she does not drink alcohol and does not use drugs.      Physical Exam:  Vitals:    01/22/24 0921   BP: (!) 162/75   Pulse: 64   Resp: 18   Temp: 97.2 °F (36.2 °C)     General: A&Ox3, no apparent distress, no deformities  Neck: No masses, normal thyroid  Lungs: normal inspiration, no use of accessory muscles  Heart: normal pulse, no arrhythmias  Abdomen: Soft, NT, ND, no masses, no hernias, no hepatosplenomegaly  Lymphatic: Neck and groin nodes negative  Skin: The skin is warm and dry. No jaundice.    Labs/Studies:   07/07/2023  EXAMINATION:  US RETROPERITONEAL COMPLETE     CLINICAL HISTORY:  Cyst of kidney, acquired     TECHNIQUE:  Routine imaging of the kidneys and bladder performed.     COMPARISON:  06/20/2022     FINDINGS:  Right kidney: The right kidney measures 11.3 cm. Echotexture normal.  2.4 cm lower pole cyst without detrimental change in internal complexity.  No nephrolithiasis.  No hydronephrosis.     Left kidney: The left kidney measures 11.4 cm. Echotexture normal.  No mass. No nephrolithiasis.  No hydronephrosis.     The bladder is unremarkable for distension.     Impression:     No acute abnormality or significant change in the right renal cyst    Impression/Plan:   1. Microhematuria  Will proceed with urinalysis, culture and cytology.  Patient will be contacted with results and needed follow-up.    2. Renal cysts  Non contrasted Bosniak 2 renal cyst, will proceed with renal ultrasound in 6 months for re-evaluation, patient to return to clinic to review.    On a personal note, patient states that she will be retiring this September.

## 2024-01-24 LAB
BACTERIA UR CULT: NORMAL
FINAL PATHOLOGIC DIAGNOSIS: NORMAL
Lab: NORMAL

## 2024-07-11 ENCOUNTER — HOSPITAL ENCOUNTER (OUTPATIENT)
Dept: RADIOLOGY | Facility: HOSPITAL | Age: 64
Discharge: HOME | End: 2024-07-11
Attending: NURSE PRACTITIONER
Payer: MEDICARE

## 2024-07-11 DIAGNOSIS — N28.1 RENAL CYST: ICD-10-CM

## 2024-07-11 PROCEDURE — 76770 US EXAM ABDO BACK WALL COMP: CPT | Mod: 26,,, | Performed by: RADIOLOGY

## 2024-07-11 PROCEDURE — 76770 US EXAM ABDO BACK WALL COMP: CPT | Mod: TC

## 2024-07-15 ENCOUNTER — OFFICE VISIT (OUTPATIENT)
Dept: UROLOGY | Facility: CLINIC | Age: 64
End: 2024-07-15
Payer: MEDICARE

## 2024-07-15 VITALS
RESPIRATION RATE: 16 BRPM | WEIGHT: 139.56 LBS | BODY MASS INDEX: 22.43 KG/M2 | HEIGHT: 66 IN | HEART RATE: 56 BPM | SYSTOLIC BLOOD PRESSURE: 165 MMHG | DIASTOLIC BLOOD PRESSURE: 78 MMHG

## 2024-07-15 DIAGNOSIS — R31.29 MICROHEMATURIA: Primary | ICD-10-CM

## 2024-07-15 PROCEDURE — 87086 URINE CULTURE/COLONY COUNT: CPT | Performed by: NURSE PRACTITIONER

## 2024-07-15 PROCEDURE — 1101F PT FALLS ASSESS-DOCD LE1/YR: CPT | Mod: CPTII,S$GLB,, | Performed by: NURSE PRACTITIONER

## 2024-07-15 PROCEDURE — 99214 OFFICE O/P EST MOD 30 MIN: CPT | Mod: S$GLB,,, | Performed by: NURSE PRACTITIONER

## 2024-07-15 PROCEDURE — 3077F SYST BP >= 140 MM HG: CPT | Mod: CPTII,S$GLB,, | Performed by: NURSE PRACTITIONER

## 2024-07-15 PROCEDURE — 81003 URINALYSIS AUTO W/O SCOPE: CPT | Performed by: NURSE PRACTITIONER

## 2024-07-15 PROCEDURE — 1159F MED LIST DOCD IN RCRD: CPT | Mod: CPTII,S$GLB,, | Performed by: NURSE PRACTITIONER

## 2024-07-15 PROCEDURE — 3078F DIAST BP <80 MM HG: CPT | Mod: CPTII,S$GLB,, | Performed by: NURSE PRACTITIONER

## 2024-07-15 PROCEDURE — 99999 PR PBB SHADOW E&M-EST. PATIENT-LVL III: CPT | Mod: PBBFAC,,, | Performed by: NURSE PRACTITIONER

## 2024-07-15 PROCEDURE — 3288F FALL RISK ASSESSMENT DOCD: CPT | Mod: CPTII,S$GLB,, | Performed by: NURSE PRACTITIONER

## 2024-07-15 PROCEDURE — 88112 CYTOPATH CELL ENHANCE TECH: CPT | Performed by: PATHOLOGY

## 2024-07-15 PROCEDURE — 1126F AMNT PAIN NOTED NONE PRSNT: CPT | Mod: CPTII,S$GLB,, | Performed by: NURSE PRACTITIONER

## 2024-07-15 PROCEDURE — 3008F BODY MASS INDEX DOCD: CPT | Mod: CPTII,S$GLB,, | Performed by: NURSE PRACTITIONER

## 2024-07-15 NOTE — PROGRESS NOTES
Chief Complaint:   Microhematuria  Renal cysts    HPI:   Patient is a 64-year-old female that is presenting as a follow-up to microhematuria.  Denies gross hematuria.  Has had a negative microhematuria workup.  No  cancers in her family.  Urine in clinic indicates small blood, all other parameters are negative.  Has been followed by this clinic for Bosniak 2 renal cysts, recent renal ultrasound indicates a stable renal cysts, no significant change.  01/22/2024  Patient is a 63-year-old female that is presenting as a follow-up to renal cyst and microhematuria.  Denies gross hematuria, pelvic or flank pain.  Urine in clinic indicated small blood, all other parameters are negative.  His had a negative microhematuria workup in the last 12 months.  Recent CT scan renal mass protocol indicated a Bosniak 2, nonreassuring renal cyst.  07/11/2023  Patient is a 63-year-old female that is presenting as a follow-up to micro hematuria and renal cyst.Patient denies gross hematuria.  Urine in clinic indicates trace leukocytes, all other parameters negative.  Patient had a negative microhematuria workup,07/2022.  Renal ultrasound noted a complex cyst, CT renal mass protocol indicated a Bosniak 2, nonenhancing renal cyst.  Allergies:  Patient has no known allergies.    Medications:  has a current medication list which includes the following prescription(s): amlodipine, b complex vitamins, biotin, cyanocobalamin (vitamin b-12), dorzolamide-timolol 2-0.5%, estradiol, olmesartan, arexvy (pf), silver sulfadiazine 1%, vitamin d, and diclofenac sodium.    Review of Systems:  General: No fever, chills, fatigability, or weight loss.  Skin: No rashes, itching, or changes in color or texture of skin.  Chest: Denies MEJIA, cyanosis, wheezing, cough, and sputum production.  Abdomen: Appetite fine. No weight loss. Denies diarrhea, abdominal pain, hematemesis, or blood in stool.  Musculoskeletal: No joint stiffness or swelling. Denies back  pain.  : As above.  All other review of systems negative.    PMH:   has a past medical history of GERD with stricture, Gout, Hormone disorder, and Hypertension.    PSH:   has a past surgical history that includes Hysterectomy (1998); cyst removal hand (Left); Knee arthroscopy (Right, 05/03/2016); Colonoscopy (06/08/2016); and Esophageal dilation.    FamHx: family history includes Diabetes in her brother; Heart disease in her sister; Hypertension in her father; Multiple sclerosis in her daughter.    SocHx:  reports that she has never smoked. She has never used smokeless tobacco. She reports that she does not drink alcohol and does not use drugs.      Physical Exam:  Vitals:    07/15/24 1014   BP: (!) 165/78   Pulse: (!) 56   Resp: 16     General: A&Ox3, no apparent distress, no deformities  Neck: No masses, normal thyroid  Lungs: normal inspiration, no use of accessory muscles  Heart: normal pulse, no arrhythmias  Abdomen: Soft, NT, ND, no masses, no hernias, no hepatosplenomegaly  Lymphatic: Neck and groin nodes negative  Skin: The skin is warm and dry. No jaundice.  Ext: No c/c/e.    Labs/Studies:   07/11/2024  EXAMINATION:  US RETROPERITONEAL COMPLETE     CLINICAL HISTORY:  Cyst of kidney, acquired     TECHNIQUE:  Routine imaging of the kidneys and bladder performed.     COMPARISON:  07/07/2023 ultrasound; CT 07/25/2022     FINDINGS:  Right kidney: The right kidney measures 10.7 cm. Echotexture normal.  2.9 cm septated high mildly complex lower pole cyst no nephrolithiasis.  No hydronephrosis.  Mildly prominent pelvis.     Left kidney: The left kidney measures 10.7 cm. Echotexture normal.  No mass. No nephrolithiasis.  No hydronephrosis.     The bladder is unremarkable for distension.     Impression:     No significant change in the right cyst allowing for differences in measurement technique  Impression/Plan:   1. Microhematuria  Urine sent for urinalysis, culture and cystoscopy.  Return to clinic in 12 months  for reassessment.    2. Bosniak 2  Renal cysts patient has had a recent renal ultrasound that is reassuring, no significant change in right cyst seen.  Will repeat renal ultrasound in 12 months, patient to return to clinic to review results.

## 2024-07-16 LAB
BILIRUB UR QL STRIP: NEGATIVE
CLARITY UR REFRACT.AUTO: CLEAR
COLOR UR AUTO: YELLOW
FINAL PATHOLOGIC DIAGNOSIS: NORMAL
GLUCOSE UR QL STRIP: NEGATIVE
HGB UR QL STRIP: ABNORMAL
KETONES UR QL STRIP: NEGATIVE
LEUKOCYTE ESTERASE UR QL STRIP: NEGATIVE
Lab: NORMAL
NITRITE UR QL STRIP: NEGATIVE
PH UR STRIP: 6 [PH] (ref 5–8)
PROT UR QL STRIP: NEGATIVE
SP GR UR STRIP: 1.02 (ref 1–1.03)
URN SPEC COLLECT METH UR: ABNORMAL

## 2024-07-17 LAB — BACTERIA UR CULT: NO GROWTH

## 2024-09-25 DIAGNOSIS — I10 PRIMARY HYPERTENSION: ICD-10-CM

## 2024-11-08 ENCOUNTER — OFFICE VISIT (OUTPATIENT)
Dept: INTERNAL MEDICINE | Facility: CLINIC | Age: 64
End: 2024-11-08
Payer: MEDICARE

## 2024-11-08 VITALS
SYSTOLIC BLOOD PRESSURE: 140 MMHG | HEIGHT: 66 IN | DIASTOLIC BLOOD PRESSURE: 78 MMHG | WEIGHT: 139.56 LBS | OXYGEN SATURATION: 98 % | BODY MASS INDEX: 22.43 KG/M2 | HEART RATE: 68 BPM

## 2024-11-08 DIAGNOSIS — Z00.00 ENCOUNTER FOR PREVENTIVE HEALTH EXAMINATION: Primary | ICD-10-CM

## 2024-11-08 DIAGNOSIS — N28.1 RENAL CYST: ICD-10-CM

## 2024-11-08 DIAGNOSIS — I10 PRIMARY HYPERTENSION: ICD-10-CM

## 2024-11-08 DIAGNOSIS — I77.819 AORTIC ECTASIA: ICD-10-CM

## 2024-11-08 DIAGNOSIS — K21.9 GASTROESOPHAGEAL REFLUX DISEASE, UNSPECIFIED WHETHER ESOPHAGITIS PRESENT: ICD-10-CM

## 2024-11-08 PROCEDURE — 99999 PR PBB SHADOW E&M-EST. PATIENT-LVL IV: CPT | Mod: PBBFAC,,, | Performed by: NURSE PRACTITIONER

## 2024-11-08 NOTE — PATIENT INSTRUCTIONS
Counseling and Referral of Other Preventative  (Italic type indicates deductible and co-insurance are waived)    Patient Name: Adelina Espitia  Today's Date: 11/8/2024    Health Maintenance       Date Due Completion Date    Shingles Vaccine (1 of 2) Never done ---    Lipid Panel 07/21/2024 7/21/2023    Override on 3/27/2017: Done (see media -Dr Miguel A Swanson)    Override on 2/2/2016: Not Clinically Appropriate (sees cards)    DEXA Scan 02/01/2025 2/1/2022    Colorectal Cancer Screening 06/08/2026 6/8/2016    Override on 6/8/2016: Done (DR ALEX CASE/[facility]. DIVERTICULOSIS ASCENDING, SIGMOID & DESCENDING COLON. REPEAT 5-10YRS)    Override on 1/1/2007: Done (Dr Case)    TETANUS VACCINE 09/01/2027 9/1/2017        No orders of the defined types were placed in this encounter.    The following information is provided to all patients.  This information is to help you find resources for any of the problems found today that may be affecting your health:                  Living healthy guide: www.[facility].[place].gov      Understanding Diabetes: www.diabetes.org      Eating healthy: www.cdc.gov/healthyweight      CDC home safety checklist: www.cdc.gov/steadi/patient.html      Agency on Aging: www.goea.[place].gov      Alcoholics anonymous (AA): www.aa.org      Physical Activity: www.bishop.nih.gov/iw4agtu      Tobacco use: www.quitwithusla.org

## 2024-11-08 NOTE — PROGRESS NOTES
"  Adelina Espitia presented for a  Medicare AWV and comprehensive Health Risk Assessment today. The following components were reviewed and updated:    Medical history  Family History  Social history  Allergies and Current Medications  Health Risk Assessment  Health Maintenance  Care Team         ** See Completed Assessments for Annual Wellness Visit within the encounter summary.**         The following assessments were completed:  Living Situation  CAGE  Depression Screening  Timed Get Up and Go  Whisper Test  Cognitive Function Screening  Nutrition Screening  ADL Screening  PAQ Screening      Opioid documentation:      Patient does not have a current opioid prescription.        Vitals:    11/08/24 1422   BP: (!) 140/78   Pulse: 68   SpO2: 98%   Weight: 63.3 kg (139 lb 8.8 oz)   Height: 5' 5.75" (1.67 m)     Body mass index is 22.7 kg/m².  Physical Exam  Vitals and nursing note reviewed.   Constitutional:       Appearance: She is well-developed.   HENT:      Head: Normocephalic.   Cardiovascular:      Rate and Rhythm: Normal rate and regular rhythm.      Heart sounds: Normal heart sounds.   Pulmonary:      Effort: Pulmonary effort is normal. No respiratory distress.      Breath sounds: Normal breath sounds.   Abdominal:      Palpations: Abdomen is soft. There is no mass.      Tenderness: There is no abdominal tenderness.   Musculoskeletal:         General: Normal range of motion.   Skin:     General: Skin is warm and dry.   Neurological:      Mental Status: She is alert and oriented to person, place, and time.      Motor: No abnormal muscle tone.   Psychiatric:         Speech: Speech normal.         Behavior: Behavior normal.               Diagnoses and health risks identified today and associated recommendations/orders:    1. Encounter for preventive health examination  - Lipid Panel; Future (scheduled)  Encouraged healthy diet and exercise as tolerated   Has urine leakage ever interrupted your daily activites or " sleep? No  Do you think you could use some help to better manage urine leakage?No   Discussed receiving Shingrix at pharmacy.      2. Aortic ectasia  Cxr 4/16  Continue current treatment plan as previously prescribed with your  pcp   - Lipid Panel; Future    3. Primary hypertension  BP elevated at today's visit. Asymptomatic. Reports home readings 120s/60s.  Discussed s/s of MI and stroke (patient denies any s/s) and advised to go to the ER/911 if occur. Advised patient to monitor BP (keep a log) and if continues to stay elevated (greater than 140/90) to follow up with PCP for further evaluation and treatment. She expressed understanding. She will recheck once home and notify pcp if not trending down.    - Lipid Panel; Future    4. Gastroesophageal reflux disease, unspecified whether esophagitis present  Stable per pt  Continue current treatment plan as previously prescribed with your  pcp     5. Renal cyst  US 7/24  Continue current treatment plan as previously prescribed with your  urologist.       Provided Adelina with a 5-10 year written screening schedule and personal prevention plan. Recommendations were developed using the USPSTF age appropriate recommendations. Education, counseling, and referrals were provided as needed. After Visit Summary printed and given to patient which includes a list of additional screenings\tests needed.    Follow up in about 1 year (around 11/8/2025) for awv.    Minna Lauren NP  I offered to discuss advanced care planning, including how to pick a person who would make decisions for you if you were unable to make them for yourself, called a health care power of , and what kind of decisions you might make such as use of life sustaining treatments such as ventilators and tube feeding when faced with a life limiting illness recorded on a living will that they will need to know. (How you want to be cared for as you near the end of your natural life)     X Patient is  interested in learning more about how to make advanced directives.  I provided them paperwork and offered to discuss this with them.

## 2024-11-11 ENCOUNTER — LAB VISIT (OUTPATIENT)
Dept: LAB | Facility: HOSPITAL | Age: 64
End: 2024-11-11
Attending: FAMILY MEDICINE
Payer: MEDICARE

## 2024-11-11 DIAGNOSIS — Z00.00 ENCOUNTER FOR PREVENTIVE HEALTH EXAMINATION: ICD-10-CM

## 2024-11-11 DIAGNOSIS — I10 PRIMARY HYPERTENSION: ICD-10-CM

## 2024-11-11 DIAGNOSIS — I77.819 AORTIC ECTASIA: ICD-10-CM

## 2024-11-11 LAB
ALBUMIN SERPL BCP-MCNC: 3.9 G/DL (ref 3.5–5.2)
ALP SERPL-CCNC: 44 U/L (ref 40–150)
ALT SERPL W/O P-5'-P-CCNC: 12 U/L (ref 10–44)
ANION GAP SERPL CALC-SCNC: 7 MMOL/L (ref 8–16)
AST SERPL-CCNC: 15 U/L (ref 10–40)
BILIRUB SERPL-MCNC: 0.7 MG/DL (ref 0.1–1)
BUN SERPL-MCNC: 17 MG/DL (ref 8–23)
CALCIUM SERPL-MCNC: 9.3 MG/DL (ref 8.7–10.5)
CHLORIDE SERPL-SCNC: 106 MMOL/L (ref 95–110)
CHOLEST SERPL-MCNC: 213 MG/DL (ref 120–199)
CHOLEST/HDLC SERPL: 2.7 {RATIO} (ref 2–5)
CO2 SERPL-SCNC: 28 MMOL/L (ref 23–29)
CREAT SERPL-MCNC: 0.9 MG/DL (ref 0.5–1.4)
EST. GFR  (NO RACE VARIABLE): >60 ML/MIN/1.73 M^2
GLUCOSE SERPL-MCNC: 81 MG/DL (ref 70–110)
HDLC SERPL-MCNC: 78 MG/DL (ref 40–75)
HDLC SERPL: 36.6 % (ref 20–50)
LDLC SERPL CALC-MCNC: 121.8 MG/DL (ref 63–159)
NONHDLC SERPL-MCNC: 135 MG/DL
POTASSIUM SERPL-SCNC: 4.4 MMOL/L (ref 3.5–5.1)
PROT SERPL-MCNC: 6.8 G/DL (ref 6–8.4)
SODIUM SERPL-SCNC: 141 MMOL/L (ref 136–145)
TRIGL SERPL-MCNC: 66 MG/DL (ref 30–150)

## 2024-11-11 PROCEDURE — 80061 LIPID PANEL: CPT | Performed by: NURSE PRACTITIONER

## 2024-11-11 PROCEDURE — 36415 COLL VENOUS BLD VENIPUNCTURE: CPT | Performed by: FAMILY MEDICINE

## 2024-11-11 PROCEDURE — 80053 COMPREHEN METABOLIC PANEL: CPT | Performed by: FAMILY MEDICINE

## 2024-12-13 ENCOUNTER — TELEPHONE (OUTPATIENT)
Dept: INTERNAL MEDICINE | Facility: CLINIC | Age: 64
End: 2024-12-13
Payer: MEDICARE

## 2024-12-16 ENCOUNTER — LAB VISIT (OUTPATIENT)
Dept: LAB | Facility: HOSPITAL | Age: 64
End: 2024-12-16
Attending: PHYSICIAN ASSISTANT
Payer: MEDICARE

## 2024-12-16 ENCOUNTER — OFFICE VISIT (OUTPATIENT)
Dept: INTERNAL MEDICINE | Facility: CLINIC | Age: 64
End: 2024-12-16
Payer: MEDICARE

## 2024-12-16 VITALS
RESPIRATION RATE: 21 BRPM | WEIGHT: 141.13 LBS | BODY MASS INDEX: 22.95 KG/M2 | OXYGEN SATURATION: 99 % | TEMPERATURE: 96 F | HEART RATE: 55 BPM | DIASTOLIC BLOOD PRESSURE: 90 MMHG | SYSTOLIC BLOOD PRESSURE: 146 MMHG

## 2024-12-16 DIAGNOSIS — E87.6 HYPOKALEMIA: ICD-10-CM

## 2024-12-16 DIAGNOSIS — I10 PRIMARY HYPERTENSION: Primary | ICD-10-CM

## 2024-12-16 DIAGNOSIS — J06.9 UPPER RESPIRATORY TRACT INFECTION, UNSPECIFIED TYPE: ICD-10-CM

## 2024-12-16 LAB
ANION GAP SERPL CALC-SCNC: 9 MMOL/L (ref 8–16)
BUN SERPL-MCNC: 15 MG/DL (ref 8–23)
CALCIUM SERPL-MCNC: 9.6 MG/DL (ref 8.7–10.5)
CHLORIDE SERPL-SCNC: 105 MMOL/L (ref 95–110)
CO2 SERPL-SCNC: 29 MMOL/L (ref 23–29)
CREAT SERPL-MCNC: 0.9 MG/DL (ref 0.5–1.4)
EST. GFR  (NO RACE VARIABLE): >60 ML/MIN/1.73 M^2
GLUCOSE SERPL-MCNC: 95 MG/DL (ref 70–110)
POTASSIUM SERPL-SCNC: 3.9 MMOL/L (ref 3.5–5.1)
SODIUM SERPL-SCNC: 143 MMOL/L (ref 136–145)

## 2024-12-16 PROCEDURE — 1160F RVW MEDS BY RX/DR IN RCRD: CPT | Mod: CPTII,S$GLB,, | Performed by: PHYSICIAN ASSISTANT

## 2024-12-16 PROCEDURE — 99214 OFFICE O/P EST MOD 30 MIN: CPT | Mod: S$GLB,,, | Performed by: PHYSICIAN ASSISTANT

## 2024-12-16 PROCEDURE — 3008F BODY MASS INDEX DOCD: CPT | Mod: CPTII,S$GLB,, | Performed by: PHYSICIAN ASSISTANT

## 2024-12-16 PROCEDURE — 3288F FALL RISK ASSESSMENT DOCD: CPT | Mod: CPTII,S$GLB,, | Performed by: PHYSICIAN ASSISTANT

## 2024-12-16 PROCEDURE — 3080F DIAST BP >= 90 MM HG: CPT | Mod: CPTII,S$GLB,, | Performed by: PHYSICIAN ASSISTANT

## 2024-12-16 PROCEDURE — 3077F SYST BP >= 140 MM HG: CPT | Mod: CPTII,S$GLB,, | Performed by: PHYSICIAN ASSISTANT

## 2024-12-16 PROCEDURE — 99999 PR PBB SHADOW E&M-EST. PATIENT-LVL III: CPT | Mod: PBBFAC,,, | Performed by: PHYSICIAN ASSISTANT

## 2024-12-16 PROCEDURE — 1101F PT FALLS ASSESS-DOCD LE1/YR: CPT | Mod: CPTII,S$GLB,, | Performed by: PHYSICIAN ASSISTANT

## 2024-12-16 PROCEDURE — 1125F AMNT PAIN NOTED PAIN PRSNT: CPT | Mod: CPTII,S$GLB,, | Performed by: PHYSICIAN ASSISTANT

## 2024-12-16 PROCEDURE — 1159F MED LIST DOCD IN RCRD: CPT | Mod: CPTII,S$GLB,, | Performed by: PHYSICIAN ASSISTANT

## 2024-12-16 PROCEDURE — 80048 BASIC METABOLIC PNL TOTAL CA: CPT | Performed by: PHYSICIAN ASSISTANT

## 2024-12-16 PROCEDURE — 36415 COLL VENOUS BLD VENIPUNCTURE: CPT | Performed by: PHYSICIAN ASSISTANT

## 2024-12-16 RX ORDER — ALPRAZOLAM 0.25 MG/1
0.25 TABLET ORAL EVERY 8 HOURS PRN
COMMUNITY

## 2024-12-16 RX ORDER — FLUTICASONE PROPIONATE 50 MCG
2 SPRAY, SUSPENSION (ML) NASAL DAILY
Qty: 9.9 ML | Refills: 0 | Status: SHIPPED | OUTPATIENT
Start: 2024-12-16

## 2024-12-16 RX ORDER — MECLIZINE HYDROCHLORIDE 25 MG/1
25 TABLET ORAL EVERY 6 HOURS PRN
COMMUNITY

## 2024-12-16 NOTE — PROGRESS NOTES
Subjective:      Patient ID: Adelina Espitia is a 64 y.o. female.    Chief Complaint: Dizziness (She is here due to dizziness/weakness, single episode that happened on Thursday 12/12. Was evaluated by ER at [place]. Still dealing with sinus pressure, headache. )    Patient is known to me, being seen today for dizziness.     HTN- amlodipine 5mg   Off olmesartan x1yr as it was causing low BP   BP controlled at home     Evaluated at [facility] ER on 12/12, complained of naure, weakness, diarrhea   Labs show hematuria (followed by Urology for microhematuria), small amount of bacteria, mild hypoK  She was prescribed xanax .25mg prn for anxiety and meclizine for dizziness     Denies nausea or diarrhea, normal stools   Mild dizziness but improving     Started w congestion and sinus pressure yesterday   Not taking anything for these symptoms currently     Last visit Jan 2024 w PCP.       Review of Systems   Constitutional:  Negative for chills, diaphoresis and fever.   HENT:  Positive for congestion and sinus pressure. Negative for rhinorrhea and sore throat.    Respiratory:  Negative for cough, shortness of breath and wheezing.    Gastrointestinal:  Negative for abdominal pain, constipation, diarrhea, nausea and vomiting.   Skin:  Negative for rash.   Neurological:  Positive for headaches (frontal/sinus). Negative for dizziness and light-headedness.       Objective:   BP (!) 146/90 (BP Location: Left arm, Patient Position: Sitting)   Pulse (!) 55   Temp (!) 95.5 °F (35.3 °C) (Tympanic)   Resp (!) 21   Wt 64 kg (141 lb 1.5 oz)   LMP  (LMP Unknown)   SpO2 99%   BMI 22.95 kg/m²   Physical Exam  Constitutional:       General: She is not in acute distress.     Appearance: She is well-developed. She is not ill-appearing or diaphoretic.   HENT:      Head: Normocephalic and atraumatic.      Right Ear: Tympanic membrane and ear canal normal. No middle ear effusion. Tympanic membrane is not erythematous.      Left Ear: Tympanic  membrane and ear canal normal.  No middle ear effusion. Tympanic membrane is not erythematous.      Nose: Mucosal edema present.      Mouth/Throat:      Pharynx: Uvula midline. Postnasal drip present. No posterior oropharyngeal erythema.   Cardiovascular:      Rate and Rhythm: Normal rate and regular rhythm.      Heart sounds: Normal heart sounds. No murmur heard.  Pulmonary:      Effort: Pulmonary effort is normal. No respiratory distress.      Breath sounds: Normal breath sounds. No decreased breath sounds, wheezing, rhonchi or rales.   Lymphadenopathy:      Cervical: No cervical adenopathy.   Skin:     General: Skin is warm and dry.      Findings: No rash.   Psychiatric:         Speech: Speech normal.         Behavior: Behavior normal.         Thought Content: Thought content normal.       Assessment:      1. Primary hypertension    2. Hypokalemia    3. Upper respiratory tract infection, unspecified type       Plan:   Primary hypertension    Hypokalemia  -     Basic Metabolic Panel; Future; Expected date: 12/16/2024    Upper respiratory tract infection, unspecified type  -     fluticasone propionate (FLONASE) 50 mcg/actuation nasal spray; 2 sprays (100 mcg total) by Each Nostril route once daily.  Dispense: 9.9 mL; Refill: 0      Start flonase + anti-histamine for URI symptoms   Complains of dry nose so may need to try only one at a time and add saline spray     Labs today     Monitor BP at home   Keep f/u w PCP next month     Discussed worsening signs/symptoms and when to return to clinic or go to ED.   Patient expresses understanding and agrees with treatment plan.

## 2025-03-04 ENCOUNTER — RESULTS FOLLOW-UP (OUTPATIENT)
Dept: INTERNAL MEDICINE | Facility: CLINIC | Age: 65
End: 2025-03-04

## 2025-03-04 ENCOUNTER — HOSPITAL ENCOUNTER (OUTPATIENT)
Dept: RADIOLOGY | Facility: HOSPITAL | Age: 65
Discharge: HOME | End: 2025-03-04
Attending: FAMILY MEDICINE
Payer: MEDICARE

## 2025-03-04 ENCOUNTER — OFFICE VISIT (OUTPATIENT)
Dept: INTERNAL MEDICINE | Facility: CLINIC | Age: 65
End: 2025-03-04
Payer: MEDICARE

## 2025-03-04 VITALS
BODY MASS INDEX: 23.7 KG/M2 | HEIGHT: 66 IN | TEMPERATURE: 97 F | DIASTOLIC BLOOD PRESSURE: 70 MMHG | SYSTOLIC BLOOD PRESSURE: 132 MMHG | WEIGHT: 147.5 LBS | OXYGEN SATURATION: 98 % | HEART RATE: 73 BPM

## 2025-03-04 DIAGNOSIS — J84.10 PULMONARY FIBROSIS, UNSPECIFIED: ICD-10-CM

## 2025-03-04 DIAGNOSIS — I10 PRIMARY HYPERTENSION: ICD-10-CM

## 2025-03-04 DIAGNOSIS — M75.02 ADHESIVE CAPSULITIS OF LEFT SHOULDER: ICD-10-CM

## 2025-03-04 DIAGNOSIS — Z00.00 ANNUAL PHYSICAL EXAM: Primary | ICD-10-CM

## 2025-03-04 DIAGNOSIS — Z78.0 ASYMPTOMATIC MENOPAUSAL STATE: ICD-10-CM

## 2025-03-04 DIAGNOSIS — Z13.820 OSTEOPOROSIS SCREENING: ICD-10-CM

## 2025-03-04 DIAGNOSIS — E55.9 VITAMIN D DEFICIENCY DISEASE: ICD-10-CM

## 2025-03-04 PROCEDURE — 71046 X-RAY EXAM CHEST 2 VIEWS: CPT | Mod: TC

## 2025-03-04 PROCEDURE — 71046 X-RAY EXAM CHEST 2 VIEWS: CPT | Mod: 26,,, | Performed by: RADIOLOGY

## 2025-03-04 PROCEDURE — 73030 X-RAY EXAM OF SHOULDER: CPT | Mod: TC,50

## 2025-03-04 PROCEDURE — 99999 PR PBB SHADOW E&M-EST. PATIENT-LVL IV: CPT | Mod: PBBFAC,,, | Performed by: FAMILY MEDICINE

## 2025-03-04 PROCEDURE — 73030 X-RAY EXAM OF SHOULDER: CPT | Mod: 26,50,, | Performed by: RADIOLOGY

## 2025-03-04 NOTE — PROGRESS NOTES
Patient ID: Adelina Espitia is a 64 y.o. female.    Chief Complaint: Annual Exam    History of Present Illness    Ms. Espitia presents today for left shoulder pain and limited range of motion    She reports bilateral shoulder issues, with left being more severe. Left shoulder symptoms include pain and limited range of motion for over 1 year without history of injury. She describes the left shoulder 'locking up' and can only raise the arm to a certain height, experiencing significant pain with further movement.    She had a normal colonoscopy in 2016 and an ER visit in December for dizziness.    She reports discontinuation of Olmesartan.      ROS:  General: -fever, -chills, -fatigue, -weight gain, -weight loss  Eyes: -vision changes, -redness, -discharge  ENT: -ear pain, -nasal congestion, -sore throat  Cardiovascular: -chest pain, -palpitations, -lower extremity edema  Respiratory: -cough, -shortness of breath  Gastrointestinal: -abdominal pain, -nausea, -vomiting, -diarrhea, -constipation, -blood in stool  Genitourinary: -dysuria, -hematuria, -frequency  Musculoskeletal: +joint pain, -muscle pain  Skin: -rash, -lesion  Neurological: -headache, -dizziness, -numbness, -tingling         Physical Exam    General: In no acute distress. Not ill-appearing or diaphoretic.  Neck: Normal thyroid. No cervical lymphadenopathy. 2+ carotid pulses.  Cardiovascular: Normal rate and regular  rhythm. No murmur heard. No gallop.  Pulmonary: Pulmonary effort is normal. No respiratory distress. No wheezing. No rhonchi. No rales.  Abdominal: Soft. Nontender. Nondistended. No mass.  Musculoskeletal: No swelling. No tenderness. No deformity.  Restricted range of motion left shoulder  Neurological: Alert.  Psychiatric: Mood normal. Behavior normal.         Assessment & Plan    Left shoulder pain: Patient reports limited range of motion and pain in the left shoulder, ongoing for over a year, worsened since nursing home. No history of  injury. Plan to obtain X-ray and refer to orthopedics for further evaluation and possible therapy.  Routine colonoscopy: Patient is due for a routine colonoscopy, last performed in 2016, with a 10-year follow-up recommended.  Bone density test: Patient is due for a DEXA scan, which will be ordered.  Blood pressure management: Patient is not currently taking Olmesartan but is taking other medications. Blood pressure readings at home are reportedly good.    PULMONARY FIBROSIS, UNSPECIFIED:  - Inquired about the patient's breathing; patient reported no issues.    LEFT SHOULDER CONDITION:  - Assessed for possible frozen shoulder.  - Ms. Espitia reports limited range of motion, stiffness, and significant pain for over a year, worsening since senior care.  - Examination confirmed limited range of motion and locking up.  - Ordered x-ray and referred to orthopedist for evaluation.  - Suggested possible therapy and continued envelope of pain.    HYPERTENSION:  - Reviewed blood pressure management, noting good control with current regimen.  - Home blood pressure readings have been good.  - Discontinued olmesartan but continued other medications.  - Will check blood pressure again at next visit.    DIZZINESS:  - Evaluated recent emergency room visit in December due to severe dizziness.  - Blood count was normal during the ER visit, but low protection levels were noted.    COLONOSCOPY:  - Ms. Espitia had normal colonoscopy in 2016.  - Advised to schedule routine colonoscopy next year, which patient plans to do.    LABS:  - Ordered CMP, lipid panel, and bone density scan (DEXA) due to patient's age and symptoms.    MAMMOGRAM:  - Follow up with gynecologist for upcoming mammogram this month.              No follow-ups on file.    This note was generated with the assistance of ambient listening technology. Verbal consent was obtained by the patient and accompanying visitor(s) for the recording of patient appointment to facilitate  this note. I attest to having reviewed and edited the generated note for accuracy, though some syntax or spelling errors may persist. Please contact the author of this note for any clarification.

## 2025-03-06 ENCOUNTER — APPOINTMENT (OUTPATIENT)
Dept: RADIOLOGY | Facility: HOSPITAL | Age: 65
End: 2025-03-06
Attending: FAMILY MEDICINE
Payer: MEDICARE

## 2025-03-06 DIAGNOSIS — Z13.820 OSTEOPOROSIS SCREENING: ICD-10-CM

## 2025-03-06 DIAGNOSIS — Z78.0 ASYMPTOMATIC MENOPAUSAL STATE: ICD-10-CM

## 2025-03-06 PROCEDURE — 77080 DXA BONE DENSITY AXIAL: CPT | Mod: 26,,, | Performed by: RADIOLOGY

## 2025-03-06 PROCEDURE — 77080 DXA BONE DENSITY AXIAL: CPT | Mod: TC

## 2025-04-02 ENCOUNTER — OFFICE VISIT (OUTPATIENT)
Dept: SPORTS MEDICINE | Facility: CLINIC | Age: 65
End: 2025-04-02
Payer: MEDICARE

## 2025-04-02 VITALS — WEIGHT: 147.5 LBS | HEIGHT: 65 IN | BODY MASS INDEX: 24.57 KG/M2

## 2025-04-02 DIAGNOSIS — M19.019 OSTEOARTHRITIS OF ACROMIOCLAVICULAR JOINT: ICD-10-CM

## 2025-04-02 DIAGNOSIS — M75.02 ADHESIVE CAPSULITIS OF LEFT SHOULDER: Primary | ICD-10-CM

## 2025-04-02 PROCEDURE — 1160F RVW MEDS BY RX/DR IN RCRD: CPT | Mod: CPTII,S$GLB,, | Performed by: STUDENT IN AN ORGANIZED HEALTH CARE EDUCATION/TRAINING PROGRAM

## 2025-04-02 PROCEDURE — 3008F BODY MASS INDEX DOCD: CPT | Mod: CPTII,S$GLB,, | Performed by: STUDENT IN AN ORGANIZED HEALTH CARE EDUCATION/TRAINING PROGRAM

## 2025-04-02 PROCEDURE — 99204 OFFICE O/P NEW MOD 45 MIN: CPT | Mod: S$GLB,,, | Performed by: STUDENT IN AN ORGANIZED HEALTH CARE EDUCATION/TRAINING PROGRAM

## 2025-04-02 PROCEDURE — 1125F AMNT PAIN NOTED PAIN PRSNT: CPT | Mod: CPTII,S$GLB,, | Performed by: STUDENT IN AN ORGANIZED HEALTH CARE EDUCATION/TRAINING PROGRAM

## 2025-04-02 PROCEDURE — 1159F MED LIST DOCD IN RCRD: CPT | Mod: CPTII,S$GLB,, | Performed by: STUDENT IN AN ORGANIZED HEALTH CARE EDUCATION/TRAINING PROGRAM

## 2025-04-02 PROCEDURE — 99999 PR PBB SHADOW E&M-EST. PATIENT-LVL III: CPT | Mod: PBBFAC,,, | Performed by: STUDENT IN AN ORGANIZED HEALTH CARE EDUCATION/TRAINING PROGRAM

## 2025-04-02 PROCEDURE — G2211 COMPLEX E/M VISIT ADD ON: HCPCS | Mod: S$GLB,,, | Performed by: STUDENT IN AN ORGANIZED HEALTH CARE EDUCATION/TRAINING PROGRAM

## 2025-04-02 NOTE — PATIENT INSTRUCTIONS
Assessment:  Adelina Espitia is a 65 y.o. female   Chief Complaint   Patient presents with    Left Shoulder - Pain       Encounter Diagnosis   Name Primary?    Adhesive capsulitis of left shoulder         Plan:  Lidocaine patches can be bought over the counter  Apply topical diclofenac (Voltaren) up to 4 times a day to the affected area.  It can be bought over the counter at any local pharmacy.    Patient can rotate ice/ heat every 2 hours for 15 minutes as needed    Follow-up: as needed.    Thank you for choosing [facility] and Dr. Too Gong for your orthopedic & sports medicine care. It is our goal to provide you with exceptional care that will help keep you healthy, active, and get you back in the game.    Please do not hesitate to reach out to us via email, phone, or MyChart with any questions, concerns, or feedback.    If you felt that you received exemplary care today, please consider leaving us feedback on [email or web address] at:  [email or web address]    If you are experiencing pain/discomfort ,or have questions after 5pm and would like to be connected to the [email or web address] on-call team, please call this number and specify which Sports Medicine provider is treating you: [phone]

## 2025-04-02 NOTE — PROGRESS NOTES
Patient ID: Adelina Espitia  YOB: 1960  MRN: [id number]    Chief Complaint: Pain of the Left Shoulder      Referred By: Guy Roberson MD    History of Present Illness: Adelina Espitia is a right-hand dominant 65 y.o. female who presents today with left shoulder pain. She reports bilateral shoulder issues, with left being more severe. Left shoulder symptoms include pain and limited range of motion for over 1 year without history of injury. She describes the left shoulder 'locking up' and can only raise the arm to a certain height, experiencing significant pain with further movement.       Occupation: retired      Past Medical History:   Past Medical History:   Diagnosis Date    GERD with stricture     Gout     Hormone disorder     Hypertension      Past Surgical History:   Procedure Laterality Date    COLONOSCOPY  06/08/2016    DR ALEX CASE. DIVERTICULOSIS. REPEAT 5-10YRS    cyst removal hand Left     ESOPHAGEAL DILATION      Dr Alex Case    HYSTERECTOMY  1998    with BSO    KNEE ARTHROSCOPY Right 05/03/2016     Family History   Problem Relation Name Age of Onset    Hypertension Father      Heart disease Sister      Diabetes Brother      Multiple sclerosis Daughter          both daughters dxd with MS     Social History[1]  Medication List with Changes/Refills   Current Medications    ALPRAZOLAM (XANAX) 0.25 MG TABLET    Take 0.25 mg by mouth every 8 (eight) hours as needed.    AMLODIPINE (NORVASC) 5 MG TABLET    Take 5 mg by mouth once daily.    B COMPLEX VITAMINS TABLET    Take 1 tablet by mouth once daily.    BIOTIN 1 MG TABLET    Take 1,000 mcg by mouth once daily.    CYANOCOBALAMIN, VITAMIN B-12, (B-12 DOTS ORAL)    Take by mouth.    DORZOLAMIDE-TIMOLOL 2-0.5% (COSOPT) 22.3-6.8 MG/ML OPHTHALMIC SOLUTION    1 drop 2 (two) times daily.    ESTRADIOL (ESTRACE) 2 MG TABLET    Takes three times a week    FLUTICASONE PROPIONATE (FLONASE) 50 MCG/ACTUATION NASAL SPRAY    2  sprays (100 mcg total) by Each Nostril route once daily.    VITAMIN D (VITAMIN D3) 1000 UNITS TAB    Take 1,000 Units by mouth once daily.     Review of patient's allergies indicates:  No Known Allergies    Physical Exam:   Body mass index is 24.54 kg/m².    GENERAL: Well appearing, in no acute distress.  HEAD: Normocephalic and atraumatic.  ENT: External ears and nose grossly normal.  EYES: EOMI bilaterally  PULMONARY: Respirations are grossly even and non-labored.  NEURO: Awake, alert, and oriented x 3.  SKIN: No obvious rashes appreciated.  PSYCH: Mood & affect are appropriate.    Detailed MSK exam:     Left shoulder exam:   -ROM: abduction 80, forward flexion 80, external rotation 50, internal rotation 30  -empty can test pain but no weakness, resisted ER pain but no weakness, belly press pain but no weakness  -curtis test guarded, neers test guarded, whipple test positive  -biceps load test negative, yerguson test negative, [place]'s test guarded  -sensation intact, pulses 2+  -TTP: anterior, lateral cuff insertion, and posterior        Imaging:  DXA Bone Density Axial Skeleton 1 or more sites  Narrative: EXAMINATION:  DXA BONE DENSITY AXIAL SKELETON 1 OR MORE SITES    CLINICAL HISTORY:  Encounter for screening for osteoporosis    TECHNIQUE:  DXA scanning was performed over the left hip and lumbar spine.  Review of the images confirms satisfactory positioning and technique.    COMPARISON:  None    FINDINGS:  The L1 to L4 vertebral bone mineral density is equal to 1.263 g/cm squared with a T score of 2.0.    The left femoral neck bone mineral density is equal to 0.858 g/cm squared with a T score of 0.1.  Impression: No evidence of significant bone density loss    Consider FDA approved medical therapies in postmenopausal women and men aged 50 years and older, based on the following:    *A hip or vertebral (clinical or morphometric) fracture  *T score less than or equal to -2.5 at the femoral neck or spine after  appropriate evaluation to exclude secondary causes.  *Low bone mass -- also known as osteopenia (T score between -1.0 and -2.5 at the femoral neck or spine) and a 10 year probability of hip fracture greater than or equal to 3% or a 10 year probability of major osteoporosis-related fracture greater than or equal to 20% based on the [place]-adapted WHO algorithm.  *Clinicians judgment and/or patient preference may indicate treatment for people with 10 year fracture probabilities is above or below these levels.    Electronically signed by: ANABEL Hadley MD  Date:    03/06/2025  Time:    13:57        Relevant imaging results were reviewed and interpreted by me and per my read shows AC arthritic changes.  This was discussed with the patient and / or family today.     Assessment:  Adelina Espitia is a 65 y.o. female presenting with left shoulder pain.   History, physical and radiographs are consistent with a likely diagnosis of adhesive capsulitis, AC OA.   Plan: ice/heat, voltaren gel, lidocaine patches as needed. Consider PT and/or steroid injection if not improving. Continue conservative management for pain.   Follow up as needed. All questions answered.      Adhesive capsulitis of left shoulder  -     Ambulatory referral/consult to Orthopedics    Osteoarthritis of acromioclavicular joint         Today's visit is associated with current or anticipated ongoing medical care related to this patient's diagnosis of osteoarthritis.  Currently there is no cure of osteoarthritis and the patient will require regular follow up to manage symptoms and progression.  The patient is to return to the office within a minimum of 3-6 months to review symptoms and function at that time.   CPT code      A copy of today's visit note has been sent to the referring provider.     Electronically signed:  Too Gong MD, MPH  04/02/2025  9:35 AM         [1]   Social History  Socioeconomic History    Marital status:      Number of children: 2   Occupational History    Occupation: baker     Employer: [employer]   Tobacco Use    Smoking status: Never    Smokeless tobacco: Never   Substance and Sexual Activity    Alcohol use: No    Drug use: No    Sexual activity: Yes     Partners: Male     Social Drivers of Health     Financial Resource Strain: Low Risk  (11/8/2024)    Overall Financial Resource Strain (CARDIA)     Difficulty of Paying Living Expenses: Not hard at all   Food Insecurity: No Food Insecurity (11/8/2024)    Hunger Vital Sign     Worried About Running Out of Food in the Last Year: Never true     Ran Out of Food in the Last Year: Never true   Transportation Needs: No Transportation Needs (11/8/2024)    PRAPARE - Transportation     Lack of Transportation (Medical): No     Lack of Transportation (Non-Medical): No   Physical Activity: Insufficiently Active (11/8/2024)    Exercise Vital Sign     Days of Exercise per Week: 2 days     Minutes of Exercise per Session: 30 min   Stress: No Stress Concern Present (11/8/2024)    [place] citizen [place] of Occupational [employer] - Occupational Stress Questionnaire     Feeling of Stress : Not at all   Housing Stability: Unknown (11/8/2024)    Housing Stability Vital Sign     Unable to Pay for Housing in the Last Year: No     Homeless in the Last Year: No

## 2025-07-15 ENCOUNTER — OFFICE VISIT (OUTPATIENT)
Dept: UROLOGY | Facility: CLINIC | Age: 65
End: 2025-07-15
Payer: MEDICARE

## 2025-07-15 ENCOUNTER — HOSPITAL ENCOUNTER (OUTPATIENT)
Dept: RADIOLOGY | Facility: HOSPITAL | Age: 65
Discharge: HOME | End: 2025-07-15
Attending: NURSE PRACTITIONER
Payer: MEDICARE

## 2025-07-15 VITALS
SYSTOLIC BLOOD PRESSURE: 173 MMHG | DIASTOLIC BLOOD PRESSURE: 71 MMHG | WEIGHT: 147.5 LBS | HEART RATE: 59 BPM | RESPIRATION RATE: 16 BRPM | TEMPERATURE: 99 F | BODY MASS INDEX: 24.57 KG/M2 | HEIGHT: 65 IN

## 2025-07-15 DIAGNOSIS — N28.1 RENAL CYST: Primary | ICD-10-CM

## 2025-07-15 DIAGNOSIS — N28.1 RENAL CYST: ICD-10-CM

## 2025-07-15 DIAGNOSIS — R31.29 MICROHEMATURIA: ICD-10-CM

## 2025-07-15 PROBLEM — M75.02 ADHESIVE CAPSULITIS OF LEFT SHOULDER: Status: RESOLVED | Noted: 2025-03-04 | Resolved: 2025-07-15

## 2025-07-15 PROBLEM — Z00.00 ANNUAL PHYSICAL EXAM: Status: RESOLVED | Noted: 2025-03-04 | Resolved: 2025-07-15

## 2025-07-15 LAB
BILIRUB UR QL STRIP.AUTO: NEGATIVE
BILIRUBIN, UA POC OHS: NEGATIVE
BLOOD, UA POC OHS: ABNORMAL
CLARITY UR: CLEAR
CLARITY, UA POC OHS: CLEAR
COLOR UR AUTO: YELLOW
COLOR, UA POC OHS: YELLOW
GLUCOSE UR QL STRIP: NEGATIVE
GLUCOSE, UA POC OHS: NEGATIVE
HGB UR QL STRIP: ABNORMAL
KETONES UR QL STRIP: NEGATIVE
KETONES, UA POC OHS: NEGATIVE
LEUKOCYTE ESTERASE UR QL STRIP: NEGATIVE
LEUKOCYTES, UA POC OHS: NEGATIVE
NITRITE UR QL STRIP: NEGATIVE
NITRITE, UA POC OHS: NEGATIVE
PH UR STRIP: 8 [PH]
PH, UA POC OHS: 7.5
POC RESIDUAL URINE VOLUME: 4 ML (ref 0–100)
PROT UR QL STRIP: NEGATIVE
PROTEIN, UA POC OHS: NEGATIVE
SP GR UR STRIP: 1.01
SPECIFIC GRAVITY, UA POC OHS: 1.02
UROBILINOGEN UR STRIP-ACNC: NEGATIVE EU/DL
UROBILINOGEN, UA POC OHS: 0.2

## 2025-07-15 PROCEDURE — 3008F BODY MASS INDEX DOCD: CPT | Mod: CPTII,S$GLB,, | Performed by: NURSE PRACTITIONER

## 2025-07-15 PROCEDURE — 99214 OFFICE O/P EST MOD 30 MIN: CPT | Mod: S$GLB,,, | Performed by: NURSE PRACTITIONER

## 2025-07-15 PROCEDURE — 1101F PT FALLS ASSESS-DOCD LE1/YR: CPT | Mod: CPTII,S$GLB,, | Performed by: NURSE PRACTITIONER

## 2025-07-15 PROCEDURE — 1159F MED LIST DOCD IN RCRD: CPT | Mod: CPTII,S$GLB,, | Performed by: NURSE PRACTITIONER

## 2025-07-15 PROCEDURE — 76770 US EXAM ABDO BACK WALL COMP: CPT | Mod: TC

## 2025-07-15 PROCEDURE — 81003 URINALYSIS AUTO W/O SCOPE: CPT | Performed by: NURSE PRACTITIONER

## 2025-07-15 PROCEDURE — 81003 URINALYSIS AUTO W/O SCOPE: CPT | Mod: QW,S$GLB,, | Performed by: NURSE PRACTITIONER

## 2025-07-15 PROCEDURE — 3288F FALL RISK ASSESSMENT DOCD: CPT | Mod: CPTII,S$GLB,, | Performed by: NURSE PRACTITIONER

## 2025-07-15 PROCEDURE — 3078F DIAST BP <80 MM HG: CPT | Mod: CPTII,S$GLB,, | Performed by: NURSE PRACTITIONER

## 2025-07-15 PROCEDURE — 88112 CYTOPATH CELL ENHANCE TECH: CPT | Mod: TC | Performed by: NURSE PRACTITIONER

## 2025-07-15 PROCEDURE — 76770 US EXAM ABDO BACK WALL COMP: CPT | Mod: 26,,, | Performed by: RADIOLOGY

## 2025-07-15 PROCEDURE — 51798 US URINE CAPACITY MEASURE: CPT | Mod: S$GLB,,, | Performed by: NURSE PRACTITIONER

## 2025-07-15 PROCEDURE — 3077F SYST BP >= 140 MM HG: CPT | Mod: CPTII,S$GLB,, | Performed by: NURSE PRACTITIONER

## 2025-07-15 PROCEDURE — 99999 PR PBB SHADOW E&M-EST. PATIENT-LVL IV: CPT | Mod: PBBFAC,,, | Performed by: NURSE PRACTITIONER

## 2025-07-15 PROCEDURE — 1126F AMNT PAIN NOTED NONE PRSNT: CPT | Mod: CPTII,S$GLB,, | Performed by: NURSE PRACTITIONER

## 2025-07-15 NOTE — PROGRESS NOTES
Chief Complaint:   Renal cysts  Microhematuria    HPI:   Patient is a 65-year-old female that is presenting as a follow-up to microhematuria.  Denies gross hematuria.  Had a negative microhematuria workup.  No  cancers in her family.  Urine in clinic indicates small blood, all other parameters are negative.  Has been followed for Bosniak 2 renal cysts, is due for imaging.  07/15/2024  Patient is a 64-year-old female that is presenting as a follow-up to microhematuria.  Denies gross hematuria.  Has had a negative microhematuria workup.  No  cancers in her family.  Urine in clinic indicates small blood, all other parameters are negative.  Has been followed by this clinic for Bosniak 2 renal cysts, recent renal ultrasound indicates a stable renal cysts, no significant change.  01/22/2024  Patient is a 63-year-old female that is presenting as a follow-up to renal cyst and microhematuria.  Denies gross hematuria, pelvic or flank pain.  Urine in clinic indicated small blood, all other parameters are negative.  His had a negative microhematuria workup in the last 12 months.  Recent CT scan renal mass protocol indicated a Bosniak 2, nonreassuring renal cyst.  07/11/2023  Patient is a 63-year-old female that is presenting as a follow-up to micro hematuria and renal cyst.Patient denies gross hematuria.  Urine in clinic indicates trace leukocytes, all other parameters negative.  Patient had a negative microhematuria workup,07/2022.  Renal ultrasound noted a complex cyst, CT renal mass protocol indicated a Bosniak 2, nonenhancing renal cyst.  Allergies:  Patient has no known allergies.    Medications:  has a current medication list which includes the following prescription(s): alprazolam, amlodipine, b complex vitamins, biotin, cyanocobalamin (vitamin b-12), dorzolamide-timolol 2-0.5%, estradiol, fluticasone propionate, and vitamin d.    Review of Systems:  General: No fever, chills, fatigability, or weight loss.  Skin: No  rashes, itching, or changes in color or texture of skin.  Chest: Denies MEJIA, cyanosis, wheezing, cough, and sputum production.  Abdomen: Appetite fine. No weight loss. Denies diarrhea, abdominal pain, hematemesis, or blood in stool.  Musculoskeletal: No joint stiffness or swelling. Denies back pain.  : As above.  All other review of systems negative.    PMH:   has a past medical history of GERD with stricture, Gout, Hormone disorder, and Hypertension.    PSH:   has a past surgical history that includes Hysterectomy (1998); cyst removal hand (Left); Knee arthroscopy (Right, 05/03/2016); Colonoscopy (06/08/2016); and Esophageal dilation.    FamHx: family history includes Diabetes in her brother; Heart disease in her sister; Hypertension in her father; Multiple sclerosis in her daughter.    SocHx:  reports that she has never smoked. She has never used smokeless tobacco. She reports that she does not drink alcohol and does not use drugs.      Physical Exam:  General: A&Ox3, no apparent distress, no deformities  Neck: No masses, normal thyroid  Lungs: normal inspiration, no use of accessory muscles  Heart: normal pulse, no arrhythmias  Abdomen: Soft, NT, ND, no masses, no hernias, no hepatosplenomegaly  Lymphatic: Neck and groin nodes negative  Skin: The skin is warm and dry. No jaundice.  Ext: No c/c/e.      Labs/Studies:   See HPI  Impression/Plan:   1. Microhematuria.  Urine sent for urinalysis and cytology.  Patient will be contacted with results.  Return to clinic in 12 months for re-evaluation    2. Bosniak 2 renal cysts  Repeat renal ultrasound, patient will be contacted with results and needed follow-up.

## 2025-07-17 LAB
ESTROGEN SERPL-MCNC: NORMAL PG/ML
INSULIN SERPL-ACNC: NORMAL U[IU]/ML
LAB AP CLINICAL INFORMATION: NORMAL
LAB AP GROSS DESCRIPTION: NORMAL
LAB AP PERFORMING LOCATION(S): NORMAL
LAB AP URINE CYTOLOGY INTERPRETATION SPECIMEN 1: NORMAL

## 2025-07-18 ENCOUNTER — TELEPHONE (OUTPATIENT)
Dept: UROLOGY | Facility: CLINIC | Age: 65
End: 2025-07-18
Payer: MEDICARE

## 2025-07-18 NOTE — TELEPHONE ENCOUNTER
Left a VM for the pt to return our call and sent a My Chart message regarding her urine cytology being negative for carcinoma.     ----- Message from Saray Dhillon NP sent at 7/18/2025 10:43 AM CDT -----  Please call. Good news, urine cytology was Negative for high grade urothelial carcinoma.    ----- Message -----  From: [facility], System Message  Sent: 7/17/2025  11:02 PM CDT  To: Saray Dhillon NP  Subject: Notification of Unviewed Test Results            RADHA FRANCE has not viewed the following results:  - URINALYSIS  - POCT URINALYSIS(INSTRUMENT)  - POCT BLADDER SCAN